# Patient Record
Sex: FEMALE | Race: WHITE | NOT HISPANIC OR LATINO | URBAN - METROPOLITAN AREA
[De-identification: names, ages, dates, MRNs, and addresses within clinical notes are randomized per-mention and may not be internally consistent; named-entity substitution may affect disease eponyms.]

---

## 2021-10-16 ENCOUNTER — EMERGENCY (EMERGENCY)
Facility: HOSPITAL | Age: 86
LOS: 1 days | Discharge: HOME | End: 2021-10-16
Attending: EMERGENCY MEDICINE | Admitting: EMERGENCY MEDICINE
Payer: MEDICARE

## 2021-10-16 VITALS
SYSTOLIC BLOOD PRESSURE: 142 MMHG | TEMPERATURE: 98 F | DIASTOLIC BLOOD PRESSURE: 72 MMHG | OXYGEN SATURATION: 99 % | RESPIRATION RATE: 18 BRPM | HEART RATE: 66 BPM

## 2021-10-16 VITALS
RESPIRATION RATE: 19 BRPM | HEART RATE: 69 BPM | TEMPERATURE: 99 F | OXYGEN SATURATION: 100 % | DIASTOLIC BLOOD PRESSURE: 72 MMHG | HEIGHT: 58 IN | WEIGHT: 89.95 LBS | SYSTOLIC BLOOD PRESSURE: 144 MMHG

## 2021-10-16 DIAGNOSIS — R22.0 LOCALIZED SWELLING, MASS AND LUMP, HEAD: ICD-10-CM

## 2021-10-16 DIAGNOSIS — L25.9 UNSPECIFIED CONTACT DERMATITIS, UNSPECIFIED CAUSE: ICD-10-CM

## 2021-10-16 LAB
ALBUMIN SERPL ELPH-MCNC: 4.1 G/DL — SIGNIFICANT CHANGE UP (ref 3.3–5)
ALP SERPL-CCNC: 69 U/L — SIGNIFICANT CHANGE UP (ref 40–120)
ALT FLD-CCNC: 12 U/L — SIGNIFICANT CHANGE UP (ref 10–45)
ANION GAP SERPL CALC-SCNC: 8 MMOL/L — SIGNIFICANT CHANGE UP (ref 5–17)
AST SERPL-CCNC: 20 U/L — SIGNIFICANT CHANGE UP (ref 10–40)
BASOPHILS # BLD AUTO: 0.03 K/UL — SIGNIFICANT CHANGE UP (ref 0–0.2)
BASOPHILS NFR BLD AUTO: 0.4 % — SIGNIFICANT CHANGE UP (ref 0–2)
BILIRUB SERPL-MCNC: 0.9 MG/DL — SIGNIFICANT CHANGE UP (ref 0.2–1.2)
BUN SERPL-MCNC: 13 MG/DL — SIGNIFICANT CHANGE UP (ref 7–23)
CALCIUM SERPL-MCNC: 9.5 MG/DL — SIGNIFICANT CHANGE UP (ref 8.4–10.5)
CHLORIDE SERPL-SCNC: 100 MMOL/L — SIGNIFICANT CHANGE UP (ref 96–108)
CO2 SERPL-SCNC: 24 MMOL/L — SIGNIFICANT CHANGE UP (ref 22–31)
CREAT SERPL-MCNC: 0.71 MG/DL — SIGNIFICANT CHANGE UP (ref 0.5–1.3)
EOSINOPHIL # BLD AUTO: 0.15 K/UL — SIGNIFICANT CHANGE UP (ref 0–0.5)
EOSINOPHIL NFR BLD AUTO: 2.2 % — SIGNIFICANT CHANGE UP (ref 0–6)
GLUCOSE SERPL-MCNC: 96 MG/DL — SIGNIFICANT CHANGE UP (ref 70–99)
HCT VFR BLD CALC: 37 % — SIGNIFICANT CHANGE UP (ref 34.5–45)
HGB BLD-MCNC: 12.5 G/DL — SIGNIFICANT CHANGE UP (ref 11.5–15.5)
IMM GRANULOCYTES NFR BLD AUTO: 0.4 % — SIGNIFICANT CHANGE UP (ref 0–1.5)
LYMPHOCYTES # BLD AUTO: 1.83 K/UL — SIGNIFICANT CHANGE UP (ref 1–3.3)
LYMPHOCYTES # BLD AUTO: 26.6 % — SIGNIFICANT CHANGE UP (ref 13–44)
MCHC RBC-ENTMCNC: 33.8 GM/DL — SIGNIFICANT CHANGE UP (ref 32–36)
MCHC RBC-ENTMCNC: 34.2 PG — HIGH (ref 27–34)
MCV RBC AUTO: 101.4 FL — HIGH (ref 80–100)
MONOCYTES # BLD AUTO: 0.53 K/UL — SIGNIFICANT CHANGE UP (ref 0–0.9)
MONOCYTES NFR BLD AUTO: 7.7 % — SIGNIFICANT CHANGE UP (ref 2–14)
NEUTROPHILS # BLD AUTO: 4.3 K/UL — SIGNIFICANT CHANGE UP (ref 1.8–7.4)
NEUTROPHILS NFR BLD AUTO: 62.7 % — SIGNIFICANT CHANGE UP (ref 43–77)
NRBC # BLD: 0 /100 WBCS — SIGNIFICANT CHANGE UP (ref 0–0)
PLATELET # BLD AUTO: 248 K/UL — SIGNIFICANT CHANGE UP (ref 150–400)
POTASSIUM SERPL-MCNC: 4.5 MMOL/L — SIGNIFICANT CHANGE UP (ref 3.5–5.3)
POTASSIUM SERPL-SCNC: 4.5 MMOL/L — SIGNIFICANT CHANGE UP (ref 3.5–5.3)
PROT SERPL-MCNC: 7.5 G/DL — SIGNIFICANT CHANGE UP (ref 6–8.3)
RBC # BLD: 3.65 M/UL — LOW (ref 3.8–5.2)
RBC # FLD: 12.7 % — SIGNIFICANT CHANGE UP (ref 10.3–14.5)
SODIUM SERPL-SCNC: 132 MMOL/L — LOW (ref 135–145)
WBC # BLD: 6.87 K/UL — SIGNIFICANT CHANGE UP (ref 3.8–10.5)
WBC # FLD AUTO: 6.87 K/UL — SIGNIFICANT CHANGE UP (ref 3.8–10.5)

## 2021-10-16 PROCEDURE — 99283 EMERGENCY DEPT VISIT LOW MDM: CPT

## 2021-10-16 PROCEDURE — 99284 EMERGENCY DEPT VISIT MOD MDM: CPT

## 2021-10-16 PROCEDURE — 36415 COLL VENOUS BLD VENIPUNCTURE: CPT

## 2021-10-16 PROCEDURE — 80053 COMPREHEN METABOLIC PANEL: CPT

## 2021-10-16 PROCEDURE — 85025 COMPLETE CBC W/AUTO DIFF WBC: CPT

## 2021-10-16 RX ORDER — CEPHALEXIN 500 MG
1 CAPSULE ORAL
Qty: 21 | Refills: 0
Start: 2021-10-16 | End: 2021-10-22

## 2021-10-16 RX ORDER — HYDROXYZINE HCL 10 MG
1 TABLET ORAL
Qty: 15 | Refills: 0
Start: 2021-10-16 | End: 2021-10-20

## 2021-10-16 NOTE — ED ADULT TRIAGE NOTE - OTHER COMPLAINTS
CC of facial swelling, extended on the pinna of the L ear x  3 days L sided, no heaviness of the face s per pt and no allergies to anything that the aide is aware of

## 2021-10-16 NOTE — ED PROVIDER NOTE - EYES, MLM
Clear bilaterally, pupils equal, round and reactive to light. + EOM b/l, no conjunctival injection b/l

## 2021-10-16 NOTE — ED ADULT NURSE NOTE - NSIMPLEMENTINTERV_GEN_ALL_ED
Implemented All Fall Risk Interventions:  [place] to call system. Call bell, personal items and telephone within reach. Instruct patient to call for assistance. Room bathroom lighting operational. Non-slip footwear when patient is off stretcher. Physically safe environment: no spills, clutter or unnecessary equipment. Stretcher in lowest position, wheels locked, appropriate side rails in place. Provide visual cue, wrist band, yellow gown, etc. Monitor gait and stability. Monitor for mental status changes and reorient to person, place, and time. Review medications for side effects contributing to fall risk. Reinforce activity limits and safety measures with patient and family.

## 2021-10-16 NOTE — ED PROVIDER NOTE - ATTENDING CONTRIBUTION TO CARE
The pt is a 89 y/o demented F, who presents to ED w/HHA, for L facial redness and L ear swelling x 3 d. According to HHA, the swelling and redness are improving, has not given pt any meds -- pt denies itching or pain. No fevers, no sore throat, no cough, no rash to body, no new foods or detergents or lotion    pt biba by hha for eval of L facial and l ear swelling x 3 d - improving but not fully resolved, no known allergens, no fevers/chills/sore throat or sob, ? contact dermatitis from face mask vs early cellulitis (less likely since symptoms improving and no warmth or tend or fevers or breaks in skin), no cervical lymphadenopathy, no stridor, labs wnl, will tx w/atarax, cover w/abx to prevent cellulitis, hha understands to ice and make certain that pt sleeps in at least 2 pillows, will f/u w/pmd for re eval in 2-3d, strict return precautions given    Agree with above note as documented by PA.  I was available as the supervising attending during patient's ER evaluation.    Pt with mild redness to left face - concern for possible cellulitis and will cover, non toxic, not septic - PA discussed with patient's PMD Dr. Escamilla who will follow up with patient within 2 days.

## 2021-10-16 NOTE — ED PROVIDER NOTE - PATIENT PORTAL LINK FT
You can access the FollowMyHealth Patient Portal offered by [facility] by registering at the following website: [email or web address]. By joining [email or web address]’s FollowMyHealth portal, you will also be able to view your health information using other applications (apps) compatible with our system.

## 2021-10-16 NOTE — ED PROVIDER NOTE - NSFOLLOWUPINSTRUCTIONS_ED_ALL_ED_FT
ice face, use new face mask, sleep on at least 2 pillows, take medication as discussed, follow up with your pmd in 2-3 days, return immediately for any worsening symptoms    Dermatitis is redness, soreness, and swelling (inflammation) of the skin. Contact dermatitis is a reaction to certain substances that touch the skin. Many substances can cause contact dermatitis including poisonous plants, chemicals, jewelry, metals, etc. Symptoms can include dryness, redness, itching, and pain.    SEEK IMMEDIATE MEDICAL CARE IF YOU HAVE ANY OF THE FOLLOWING SYMPTOMS: headache, fever, vomiting, red streaking from the affected area, or shortness of breath.

## 2021-10-16 NOTE — ED PROVIDER NOTE - ENMT, MLM
Airway patent, Nasal mucosa clear. Mouth with normal mucosa. Throat has no vesicles, no oropharyngeal exudates and uvula is midline. no stridor, no cervical lymphadenopathy b/l, + pale erythema to L upper and lower orbit, L nostril and L cheek, + swelling to L pinna -- face mask noted to be very tight on face and ears

## 2021-10-16 NOTE — ED PROVIDER NOTE - CLINICAL SUMMARY MEDICAL DECISION MAKING FREE TEXT BOX
pt biba by hha for eval of L facial and l ear swelling x 3 d - improving but not fully resolved, no known allergens, no fevers/chills/sore throat or sob, ? contact dermatitis from face mask vs early cellulitis (less likely since symptoms improving and no warmth or tend or fevers or breaks in skin), no cervical lymphadenopathy, no stridor, labs  , will tx w/atarax, cover w/abx to prevent cellulitis, hha understands to ice and make certain that pt sleeps in at least 2 pillows, will f/u w/pmd for re eval in 2-3d, strict return precautions given pt biba by hha for eval of L facial and l ear swelling x 3 d - improving but not fully resolved, no known allergens, no fevers/chills/sore throat or sob, ? contact dermatitis from face mask vs early cellulitis (less likely since symptoms improving and no warmth or tend or fevers or breaks in skin), no cervical lymphadenopathy, no stridor, labs wnl, will tx w/atarax, cover w/abx to prevent cellulitis, hha understands to ice and make certain that pt sleeps in at least 2 pillows, will f/u w/pmd for re eval in 2-3d, strict return precautions given

## 2021-10-16 NOTE — ED PROVIDER NOTE - OBJECTIVE STATEMENT
The pt is a 89 y/o demented F, who presents to ED w/HHA, for L facial redness and L ear swelling x 3 d. According to HHA, the swelling and redness are improving, has not given pt any meds -- pt denies itching or pain. No fevers, no sore throat, no cough, no rash to body, no new foods or detergents or lotion

## 2021-10-16 NOTE — ED ADULT NURSE NOTE - OBJECTIVE STATEMENT
pt from home with home attended, hx dementia poor hx, left eye and left side of face redness/swollen,  possible allergic reaction

## 2022-09-07 ENCOUNTER — TRANSCRIPTION ENCOUNTER (OUTPATIENT)
Age: 87
End: 2022-09-07

## 2022-09-07 ENCOUNTER — INPATIENT (INPATIENT)
Facility: HOSPITAL | Age: 87
LOS: 0 days | Discharge: HOME HEALTH | DRG: 556 | End: 2022-09-07
Attending: INTERNAL MEDICINE | Admitting: STUDENT IN AN ORGANIZED HEALTH CARE EDUCATION/TRAINING PROGRAM
Payer: MEDICARE

## 2022-09-07 VITALS
OXYGEN SATURATION: 95 % | TEMPERATURE: 98 F | HEIGHT: 58 IN | HEART RATE: 76 BPM | DIASTOLIC BLOOD PRESSURE: 61 MMHG | RESPIRATION RATE: 15 BRPM | WEIGHT: 130.07 LBS | SYSTOLIC BLOOD PRESSURE: 142 MMHG

## 2022-09-07 VITALS — DIASTOLIC BLOOD PRESSURE: 63 MMHG | SYSTOLIC BLOOD PRESSURE: 161 MMHG

## 2022-09-07 DIAGNOSIS — Z29.9 ENCOUNTER FOR PROPHYLACTIC MEASURES, UNSPECIFIED: ICD-10-CM

## 2022-09-07 DIAGNOSIS — F03.90 UNSPECIFIED DEMENTIA WITHOUT BEHAVIORAL DISTURBANCE: ICD-10-CM

## 2022-09-07 DIAGNOSIS — I10 ESSENTIAL (PRIMARY) HYPERTENSION: ICD-10-CM

## 2022-09-07 DIAGNOSIS — Z96.651 PRESENCE OF RIGHT ARTIFICIAL KNEE JOINT: Chronic | ICD-10-CM

## 2022-09-07 DIAGNOSIS — M25.561 PAIN IN RIGHT KNEE: ICD-10-CM

## 2022-09-07 LAB
ANION GAP SERPL CALC-SCNC: 9 MMOL/L — SIGNIFICANT CHANGE UP (ref 5–17)
BUN SERPL-MCNC: 15 MG/DL — SIGNIFICANT CHANGE UP (ref 7–23)
CALCIUM SERPL-MCNC: 9.3 MG/DL — SIGNIFICANT CHANGE UP (ref 8.4–10.5)
CHLORIDE SERPL-SCNC: 100 MMOL/L — SIGNIFICANT CHANGE UP (ref 96–108)
CO2 SERPL-SCNC: 22 MMOL/L — SIGNIFICANT CHANGE UP (ref 22–31)
CREAT SERPL-MCNC: 0.86 MG/DL — SIGNIFICANT CHANGE UP (ref 0.5–1.3)
CRP SERPL-MCNC: 21.9 MG/L — HIGH (ref 0–4)
EGFR: 62 ML/MIN/1.73M2 — SIGNIFICANT CHANGE UP
ERYTHROCYTE [SEDIMENTATION RATE] IN BLOOD: 36 MM/HR — HIGH
GLUCOSE SERPL-MCNC: 119 MG/DL — HIGH (ref 70–99)
HCT VFR BLD CALC: 38.3 % — SIGNIFICANT CHANGE UP (ref 34.5–45)
HGB BLD-MCNC: 13 G/DL — SIGNIFICANT CHANGE UP (ref 11.5–15.5)
MCHC RBC-ENTMCNC: 33.9 GM/DL — SIGNIFICANT CHANGE UP (ref 32–36)
MCHC RBC-ENTMCNC: 33.9 PG — SIGNIFICANT CHANGE UP (ref 27–34)
MCV RBC AUTO: 99.7 FL — SIGNIFICANT CHANGE UP (ref 80–100)
NRBC # BLD: 0 /100 WBCS — SIGNIFICANT CHANGE UP (ref 0–0)
PLATELET # BLD AUTO: 248 K/UL — SIGNIFICANT CHANGE UP (ref 150–400)
POTASSIUM SERPL-MCNC: 4.7 MMOL/L — SIGNIFICANT CHANGE UP (ref 3.5–5.3)
POTASSIUM SERPL-SCNC: 4.7 MMOL/L — SIGNIFICANT CHANGE UP (ref 3.5–5.3)
RBC # BLD: 3.84 M/UL — SIGNIFICANT CHANGE UP (ref 3.8–5.2)
RBC # FLD: 12.8 % — SIGNIFICANT CHANGE UP (ref 10.3–14.5)
SARS-COV-2 RNA SPEC QL NAA+PROBE: NEGATIVE — SIGNIFICANT CHANGE UP
SODIUM SERPL-SCNC: 131 MMOL/L — LOW (ref 135–145)
WBC # BLD: 9.13 K/UL — SIGNIFICANT CHANGE UP (ref 3.8–10.5)
WBC # FLD AUTO: 9.13 K/UL — SIGNIFICANT CHANGE UP (ref 3.8–10.5)

## 2022-09-07 PROCEDURE — 99285 EMERGENCY DEPT VISIT HI MDM: CPT | Mod: 25

## 2022-09-07 PROCEDURE — 73562 X-RAY EXAM OF KNEE 3: CPT | Mod: 26,RT

## 2022-09-07 PROCEDURE — 73552 X-RAY EXAM OF FEMUR 2/>: CPT | Mod: 26,RT

## 2022-09-07 PROCEDURE — 72170 X-RAY EXAM OF PELVIS: CPT | Mod: 26

## 2022-09-07 PROCEDURE — 93010 ELECTROCARDIOGRAM REPORT: CPT

## 2022-09-07 PROCEDURE — 73502 X-RAY EXAM HIP UNI 2-3 VIEWS: CPT | Mod: 26,RT

## 2022-09-07 PROCEDURE — 99233 SBSQ HOSP IP/OBS HIGH 50: CPT | Mod: GC

## 2022-09-07 RX ORDER — LANOLIN ALCOHOL/MO/W.PET/CERES
3 CREAM (GRAM) TOPICAL AT BEDTIME
Refills: 0 | Status: DISCONTINUED | OUTPATIENT
Start: 2022-09-07 | End: 2022-09-07

## 2022-09-07 RX ORDER — MIRTAZAPINE 45 MG/1
1 TABLET, ORALLY DISINTEGRATING ORAL
Qty: 0 | Refills: 0 | DISCHARGE

## 2022-09-07 RX ORDER — ONDANSETRON 8 MG/1
4 TABLET, FILM COATED ORAL EVERY 8 HOURS
Refills: 0 | Status: DISCONTINUED | OUTPATIENT
Start: 2022-09-07 | End: 2022-09-07

## 2022-09-07 RX ORDER — ATORVASTATIN CALCIUM 80 MG/1
10 TABLET, FILM COATED ORAL AT BEDTIME
Refills: 0 | Status: DISCONTINUED | OUTPATIENT
Start: 2022-09-07 | End: 2022-09-07

## 2022-09-07 RX ORDER — ATORVASTATIN CALCIUM 80 MG/1
10 TABLET, FILM COATED ORAL DAILY
Refills: 0 | Status: DISCONTINUED | OUTPATIENT
Start: 2022-09-07 | End: 2022-09-07

## 2022-09-07 RX ORDER — ACETAMINOPHEN 500 MG
1 TABLET ORAL
Qty: 21 | Refills: 0
Start: 2022-09-07 | End: 2022-09-13

## 2022-09-07 RX ORDER — ACETAMINOPHEN 500 MG
650 TABLET ORAL EVERY 6 HOURS
Refills: 0 | Status: DISCONTINUED | OUTPATIENT
Start: 2022-09-07 | End: 2022-09-07

## 2022-09-07 RX ORDER — LOSARTAN POTASSIUM 100 MG/1
100 TABLET, FILM COATED ORAL DAILY
Refills: 0 | Status: DISCONTINUED | OUTPATIENT
Start: 2022-09-07 | End: 2022-09-07

## 2022-09-07 RX ORDER — TELMISARTAN 20 MG/1
1 TABLET ORAL
Qty: 0 | Refills: 0 | DISCHARGE
Start: 2022-09-07

## 2022-09-07 RX ORDER — ATORVASTATIN CALCIUM 80 MG/1
1 TABLET, FILM COATED ORAL
Qty: 0 | Refills: 0 | DISCHARGE
Start: 2022-09-07

## 2022-09-07 RX ORDER — ACETAMINOPHEN 500 MG
650 TABLET ORAL ONCE
Refills: 0 | Status: COMPLETED | OUTPATIENT
Start: 2022-09-07 | End: 2022-09-07

## 2022-09-07 RX ORDER — MIRTAZAPINE 45 MG/1
7.5 TABLET, ORALLY DISINTEGRATING ORAL DAILY
Refills: 0 | Status: DISCONTINUED | OUTPATIENT
Start: 2022-09-07 | End: 2022-09-07

## 2022-09-07 RX ADMIN — Medication 650 MILLIGRAM(S): at 04:43

## 2022-09-07 RX ADMIN — MIRTAZAPINE 7.5 MILLIGRAM(S): 45 TABLET, ORALLY DISINTEGRATING ORAL at 12:12

## 2022-09-07 RX ADMIN — Medication 1 TABLET(S): at 12:12

## 2022-09-07 NOTE — PATIENT PROFILE ADULT - FALL HARM RISK - HARM RISK INTERVENTIONS

## 2022-09-07 NOTE — PHYSICAL THERAPY INITIAL EVALUATION ADULT - ADDITIONAL COMMENTS
Pt poor historian 2/2 confusion. Pt reports living in apartment with elevator access. Reports having rolling walker and cane, but uses cane to ambulate. Reports living with "sister". Although as per H&P pt has 24/7 HHA.

## 2022-09-07 NOTE — ED PROVIDER NOTE - PHYSICAL EXAMINATION
General: Awake, alert. No acute distress. Well developed, hydrated and nourished. Appears stated age.   Skin: Skin in warm, dry and intact without rashes or lesions. Appropriate color for ethnicity  HENMT: head normocephalic and atraumatic; bilateral external ears without swelling. no nasal discharge. moist oral mucosa. supple neck, trachea midline  EYES: Conjunctiva clear. nonicteric sclera. EOM intact, Eyelids are normal in appearance without swelling or lesions.  Cardiac: well perfused  Respiratory: breathing comfortably on room air. no audible wheezing or stridor  Abdominal: nondistended  MSK: Neck and back are without deformity, visible external skin changes, or signs of trauma. Curvature of the cervical, thoracic, and lumbar spine are within normal limits. no external signs of trauma. no apparent deficits in ROM of any extremity, unable to ambulate without signifncant pain and multi person support. ttp over anterior right knee and femur, no hip ttp, passive and active rom of hip and knee intact anf full  Neurological: The patient is awake, alert and oriented to person, place, not time, normal speech. CN 2-12 grossly intact. no apparent deficits. Memory is normal and thought process is intact. 5/5 strength in all 4 extremities  Psychiatric: Appropriate mood and affect. Good judgement and insight. No visual or auditory hallucinations.

## 2022-09-07 NOTE — H&P ADULT - PROBLEM SELECTOR PLAN 3
Patient with pmhx of hypertension on telmisartan 80mg qdaily at home.   - started patient on TI losartan 100mg 1daily Patient with pmhx of hypertension on telmisartan 80mg qdaily at home.   - started patient on TI losartan 100mg qdaily Patient with pmhx of hypertension on telmisartan 80mg qdaily at home.   - started patient on therapeutic interchange losartan 100mg qdaily

## 2022-09-07 NOTE — ED ADULT NURSE REASSESSMENT NOTE - NS ED NURSE REASSESS COMMENT FT1
received pt from RN. pt resting comfortably on stretcher with tech at bedside. respirations equal and unlabored. pending sign out from MD.

## 2022-09-07 NOTE — DISCHARGE NOTE PROVIDER - HOSPITAL COURSE
#Discharge:     89y/o Female with a pmhx of dementia and HTN, s/p right knee replacement, presenting with an inability to walk. As per [facility], patient lives alone with 24hr HHA. HHA reports that patient normally ambulates on a cane, but recently started complaining of right knee pain that has progressed and prevented her from getting up and walking. Patient was admitted for R knee pain.     Hospital course:    #Right knee pain  Patient with pmhx of right knee replacement unclear date of surgery, presenting with recent complaints of pain in her right knee as per HHA. HHA denies any trauma, fall, recent injury. Walks with cane at home at baseline.   Right knee xray WNL no fracture or effusion. Osteopenia. No fevers, chills, or leukocytosis, On exam no erythema, non-tender, no effusion, normal ROM, no warmth. Less concern for cellulitis or septic arthritis. More likely related OA and old knee replacement. Patient received pain control with acetaminophen 650mg q6hr PRN for mild pain. PT was consulted and recommended home PT.    #Dementia  Patient presented with A, as per HHA patient has dementia. On no dementia meds. Legal  makes decisions for patient. Baseline AAOx1     #Hypertension  Patient with pmhx of hypertension on telmisartan 80mg qdaily at home. Patient was started on therapeutic interchange losartan 100mg qdaily.    Patient was discharged to home with home PT.     New medications: acetaminophen 650mg every 8 hours as needed for pain.   Changes to old medications: no changes to old medications.   Medications that were stopped: none.     Items to follow up as outpatient: Please follow up with Dr. Trip Escamilla PCP once he returns from vacation.     Physical exam at the time of discharge:  General: NAD, well developed, appears stated age, pleasant  HEENT: NC/AT; EOMI, anisocoria   Neck: supple, trachea midline, no LAD, no JVD  Cardiovascular: RRR, +S1/S2  Respiratory: CTAB, no increased work of breathing, no accessory muscle use  Gastrointestinal: soft, NT/ND, normal bowel sounds, no guarding  Extremities: WWP; no edema or cyanosis , Right knee full ROM nontender, no wound or abrasion, no erythema, no warm, no effusion, Mild crepitus in exam,   Vascular: 2+ radial, DP pulses b/l  Neurological: AAOx1, no focal deficits  Dermatological: no rashes noted, scar over right knee consistent with history of R knee replacement  Gait: Ambulating with ED walker, able to take small steps

## 2022-09-07 NOTE — H&P ADULT - ATTENDING COMMENTS
Patient does not have any fever , no recent trauma , no joint swelling , no joint discoloration , no leg swelling , no leg ulcer,  no hip swelling , no leg discoloration    Has good peripheral pulses   No calf, quads, hamstring  tenderness   ROM of left ankle , knee and hip wnl and not restricted by pain     PT eval

## 2022-09-07 NOTE — DISCHARGE NOTE PROVIDER - NSDCMRMEDTOKEN_GEN_ALL_CORE_FT
atorvastatin 10 mg oral tablet: 1 tab(s) orally once a day  mirtazapine 7.5 mg oral tablet: 1 tab(s) orally once a day  Multiple Vitamins oral tablet: 1 tab(s) orally once a day  telmisartan 80 mg oral tablet: 1 tab(s) orally once a day  Tylenol 8 Hour 650 mg oral tablet, extended release: 1 tab(s) orally every 8 hours PRN pain

## 2022-09-07 NOTE — DISCHARGE NOTE PROVIDER - NSDCCPCAREPLAN_GEN_ALL_CORE_FT
PRINCIPAL DISCHARGE DIAGNOSIS  Diagnosis: Right knee pain  Assessment and Plan of Treatment: You presented with right knee pain and an inability to walk. Your vitals were stable, there were no abormalities in your labs. X-ray demonstrated no evidence of fractures or effusions. You received acetaminophen 650mg once in the ED. On physical exam, you had full range of motion in your right knee. There was no evidence of warmth, tenderness to palpation, swelling, or erythema.   You were evaluated by physical therapy and they recommended home physical therapy. Please follow up with physical therapy. You are also being discharged with tylenol 650mg every 8 hours as needed for pain. Please take this medication whenever you feel pain. Please also follow up with Dr. Trip Escamilla when he returns to his office.

## 2022-09-07 NOTE — DISCHARGE NOTE NURSING/CASE MANAGEMENT/SOCIAL WORK - PATIENT PORTAL LINK FT
You can access the FollowMyHealth Patient Portal offered by [facility] by registering at the following website: [email or web address]. By joining [facility]’s FollowMyHealth portal, you will also be able to view your health information using other applications (apps) compatible with our system.

## 2022-09-07 NOTE — DISCHARGE NOTE PROVIDER - CARE PROVIDER_API CALL
Trip Escamilla  INTERNAL MEDICINE  [address] [place] [id number]  Phone: [phone]  Fax: [phone]  Established Patient  Follow Up Time: 2 weeks   Trip Escamilla  INTERNAL MEDICINE  [address] [place] [id number]  Phone: [phone]  Fax: [phone]  Established Patient  Scheduled Appointment: 10/03/2022 11:00 AM

## 2022-09-07 NOTE — H&P ADULT - NSHPPHYSICALEXAM_GEN_ALL_CORE
PHYSICAL EXAM:    Vital Signs Last 24 Hrs  T(C): 36.6 (07 Sep 2022 08:36), Max: 36.8 (07 Sep 2022 02:11)  T(F): 97.9 (07 Sep 2022 08:36), Max: 98.3 (07 Sep 2022 02:11)  HR: 63 (07 Sep 2022 08:36) (63 - 76)  BP: 127/82 (07 Sep 2022 08:36) (127/82 - 145/74)  BP(mean): --  RR: 18 (07 Sep 2022 08:36) (15 - 18)  SpO2: 98% (07 Sep 2022 08:36) (95% - 98%)    Parameters below as of 07 Sep 2022 08:36  Patient On (Oxygen Delivery Method): room air      I&O's Summary      General: NAD, well developed, appears stated age, pleasant  HEENT: NC/AT; EOMI  Neck: supple, trachea midline, no LAD, no JVD  Cardiovascular: RRR, +S1/S2  Respiratory: CTAB, no increased work of breathing, no accessory muscle use  Gastrointestinal: soft, NT/ND, normal bowel sounds, no gaurding  Extremities: WWP; no edema or cyanosis   Vascular: 2+ radial, DP pulses b/l  Neurological: AAOx2, no focal deficits  Dermatological: no rashes noted, scar over right knee consistent with history of R knee replacement  Gait: Ambulating with ED walker, able to take small steps PHYSICAL EXAM:    Vital Signs Last 24 Hrs  T(C): 36.6 (07 Sep 2022 08:36), Max: 36.8 (07 Sep 2022 02:11)  T(F): 97.9 (07 Sep 2022 08:36), Max: 98.3 (07 Sep 2022 02:11)  HR: 63 (07 Sep 2022 08:36) (63 - 76)  BP: 127/82 (07 Sep 2022 08:36) (127/82 - 145/74)  BP(mean): --  RR: 18 (07 Sep 2022 08:36) (15 - 18)  SpO2: 98% (07 Sep 2022 08:36) (95% - 98%)    Parameters below as of 07 Sep 2022 08:36  Patient On (Oxygen Delivery Method): room air      I&O's Summary      General: NAD, well developed, appears stated age, pleasant  HEENT: NC/AT; EOMI  Neck: supple, trachea midline, no LAD, no JVD  Cardiovascular: RRR, +S1/S2  Respiratory: CTAB, no increased work of breathing, no accessory muscle use  Gastrointestinal: soft, NT/ND, normal bowel sounds, no guarding  Extremities: WWP; no edema or cyanosis   Vascular: 2+ radial, DP pulses b/l  Neurological: AAOx1, no focal deficits  Dermatological: no rashes noted, scar over right knee consistent with history of R knee replacement  Gait: Ambulating with ED walker, able to take small steps PHYSICAL EXAM:    Vital Signs Last 24 Hrs  T(C): 36.6 (07 Sep 2022 08:36), Max: 36.8 (07 Sep 2022 02:11)  T(F): 97.9 (07 Sep 2022 08:36), Max: 98.3 (07 Sep 2022 02:11)  HR: 63 (07 Sep 2022 08:36) (63 - 76)  BP: 127/82 (07 Sep 2022 08:36) (127/82 - 145/74)  BP(mean): --  RR: 18 (07 Sep 2022 08:36) (15 - 18)  SpO2: 98% (07 Sep 2022 08:36) (95% - 98%)    Parameters below as of 07 Sep 2022 08:36  Patient On (Oxygen Delivery Method): room air      I&O's Summary      General: NAD, well developed, appears stated age, pleasant  HEENT: NC/AT; EOMI  Neck: supple, trachea midline, no LAD, no JVD  Cardiovascular: RRR, +S1/S2  Respiratory: CTAB, no increased work of breathing, no accessory muscle use  Gastrointestinal: soft, NT/ND, normal bowel sounds, no guarding  Extremities: WWP; no edema or cyanosis , Right knee full ROM nontender, no wound or abrasion, no erythema, no warm, no effusion, Mild crepitus in exam,   Vascular: 2+ radial, DP pulses b/l  Neurological: AAOx1, no focal deficits  Dermatological: no rashes noted, scar over right knee consistent with history of R knee replacement  Gait: Ambulating with ED walker, able to take small steps PHYSICAL EXAM:    Vital Signs Last 24 Hrs  T(C): 36.6 (07 Sep 2022 08:36), Max: 36.8 (07 Sep 2022 02:11)  T(F): 97.9 (07 Sep 2022 08:36), Max: 98.3 (07 Sep 2022 02:11)  HR: 63 (07 Sep 2022 08:36) (63 - 76)  BP: 127/82 (07 Sep 2022 08:36) (127/82 - 145/74)  BP(mean): --  RR: 18 (07 Sep 2022 08:36) (15 - 18)  SpO2: 98% (07 Sep 2022 08:36) (95% - 98%)    Parameters below as of 07 Sep 2022 08:36  Patient On (Oxygen Delivery Method): room air      I&O's Summary      General: NAD, well developed, appears stated age, pleasant  HEENT: NC/AT; EOMI, anisocoria   Neck: supple, trachea midline, no LAD, no JVD  Cardiovascular: RRR, +S1/S2  Respiratory: CTAB, no increased work of breathing, no accessory muscle use  Gastrointestinal: soft, NT/ND, normal bowel sounds, no guarding  Extremities: WWP; no edema or cyanosis , Right knee full ROM nontender, no wound or abrasion, no erythema, no warm, no effusion, Mild crepitus in exam,   Vascular: 2+ radial, DP pulses b/l  Neurological: AAOx1, no focal deficits  Dermatological: no rashes noted, scar over right knee consistent with history of R knee replacement  Gait: Ambulating with ED walker, able to take small steps

## 2022-09-07 NOTE — H&P ADULT - PROBLEM SELECTOR PLAN 1
Patient with pmhx of right knee replacement, presenting with recent complaints of pain in her right knee as per HHA.   - Pain control Patient with pmhx of right knee replacement, presenting with recent complaints of pain in her right knee as per HHA.   - Pain control with acetaminophen 650mg q6hr PRN for mild pain   - f/u PT consult Patient with pmhx of right knee replacement unclear date of surgery, presenting with recent complaints of pain in her right knee as per HHA. HHA denies any trauma, fall, recent injury. Walks with cane at home at baseline.   - Right knee preliminary xray WNL no Fx or effusion. Pending official read. No fever, no chill, no leukocytosis, On exam no erythema, non tender, no effusion, normal ROM, No warm. Less concern for cellulitis or septic arthritis.   -f/u ESR and CRP   - Pain control with acetaminophen 650mg q6hr PRN for mild pain   - f/u PT consult  -Fall percussion Patient with pmhx of right knee replacement unclear date of surgery, presenting with recent complaints of pain in her right knee as per HHA. HHA denies any trauma, fall, recent injury. Walks with cane at home at baseline.   - Right knee preliminary xray WNL no Fx or effusion. Pending official read. No fever, no chill, no leukocytosis, On exam no erythema, non tender, no effusion, normal ROM, No warm. Less concern for cellulitis or septic arthritis. More likely related OA and old knee replacement   -f/u ESR and CRP   - Pain control with acetaminophen 650mg q6hr PRN for mild pain   - f/u PT consult  -Fall percussion Patient with pmhx of right knee replacement unclear date of surgery, presenting with recent complaints of pain in her right knee as per HHA. HHA denies any trauma, fall, recent injury. Walks with cane at home at baseline.   - Right knee preliminary xray WNL no Fx or effusion. Pending official read. No fever, no chill, no leukocytosis, On exam no erythema, non tender, no effusion, normal ROM, No warm. Less concern for cellulitis or septic arthritis. More likely related OA and old knee replacement   - f/u ESR and CRP   - Pain control with acetaminophen 650mg q6hr PRN for mild pain   - f/u PT consult  - Fall precaution Patient with pmhx of right knee replacement unclear date of surgery, presenting with recent complaints of pain in her right knee as per HHA. HHA denies any trauma, fall, recent injury. Walks with cane at home at baseline.   - Right knee xray WNL no fracture or effusion. Osteopenia. No fevers, chills, or leukocytosis, On exam no erythema, non-tender, no effusion, normal ROM, no warmth. Less concern for cellulitis or septic arthritis. More likely related OA and old knee replacement   - f/u ESR and CRP   - Pain control with acetaminophen 650mg q6hr PRN for mild pain   - f/u PT consult  - Fall precaution

## 2022-09-07 NOTE — PATIENT PROFILE ADULT - FUNCTIONAL ASSESSMENT - BASIC MOBILITY 6.
3-calculated by average/Not able to assess (calculate score using [facility] averaging method)

## 2022-09-07 NOTE — PHYSICAL THERAPY INITIAL EVALUATION ADULT - GAIT DEVIATIONS NOTED, PT EVAL
RLE externally rotate, decreased step length RLE, impaired tolerance for single leg stance on RLE/decreased shaina/decreased step length/decreased weight-shifting ability

## 2022-09-07 NOTE — H&P ADULT - HISTORY OF PRESENT ILLNESS
Patient ONEIDA RANDHAWA is a 89y Female with a past medical history of dementia, s/p right knee replacement, presenting with an inability to walk. As per HHA, patient lives alone with 24hr HHA. HHA reports that patient normally ambulates on a cane, but recently started complaining of right knee pain that has progressed and prevented her from getting up and walking.   ROS: HHA denies any recent fever, chills, vomiting, or extremity swelling.    PCP: Dr. Trip Escamilla  Pharmacy: [place] [address]    ED Course  Vitals: T 98.3 /61  HR 76 RR 15 SaO2 95% on room air   Labs: Na 131  Imaging: x-ray  Medication: atorvastatin 10 mg qdaily, mirtazapine 7.5mg qdaily, telmisartan 80mg qdaily        Patient ONEIDA RANDHAWA is a 89y Female with a past medical history of dementia, s/p right knee replacement, presenting with an inability to walk. As per HHA, patient lives alone with 24hr HHA. HHA reports that patient normally ambulates on a cane, but recently started complaining of right knee pain that has progressed and prevented her from getting up and walking.   ROS: HHA denies any recent fever, chills, vomiting, or extremity swelling.    PCP: Dr. Trip Escamilla  Patient's : Enmanuel [phone]  Pharmacy: [place] [address]    ED Course  Vitals: T 98.3 /61  HR 76 RR 15 SaO2 95% on room air   Labs: Na 131  Imaging: x-ray  Medication: atorvastatin 10 mg qdaily, mirtazapine 7.5mg qdaily, telmisartan 80mg qdaily        Patient ONEIDA RANDHAWA is a 89y Female with a past medical history of dementia, s/p right knee replacement, presenting with an inability to walk. As per HHA, patient lives alone with 24hr HHA. HHA reports that patient normally ambulates on a cane, but recently started complaining of right knee pain that has progressed and prevented her from getting up and walking.   ROS: HHA denies any recent fever, chills, vomiting, or extremity swelling.    PCP: Dr. Trip Escamilla  Patient's : Enmanuel [phone]  [facility] [address] [phone]    ED Course  Vitals: T 98.3 /61  HR 76 RR 15 SaO2 95% on room air   Labs: Na 131  Imaging: x-ray  Medication: acetaminophen 650mg x1       Patient ONEIDA RANDHAWA is a 89y Female with a past medical history of dementia, s/p right knee replacement, presenting with an inability to walk. As per A, patient lives alone with 24hr HHA. HHA reports that patient normally ambulates on a cane, but recently started complaining of right knee pain that has progressed and prevented her from getting up and walking.   ROS: HHA denies any recent fever, chills, vomiting, or extremity swelling.  Patient denies HA, dizziness N/V, SOB, CP, abdominal pain, dysuria.     PCP: Dr. Trip Escamilla  Patient's : Enmanuel [phone]  Pharmacy: [place] [address] [phone]    ED Course  Vitals: T 98.3 /61  HR 76 RR 15 SaO2 95% on room air   Labs: CBC grossly nl, CMP: normal except Na 131  Imaging: x-ray  Medication: acetaminophen 650mg x1       Patient ONEIDA RANDHAWA is a 89y Female with a past medical history of dementia, s/p right knee replacement, presenting with an inability to walk. As per A, patient lives alone with 24hr HHA. HHA reports that patient normally ambulates on a cane, but recently started complaining of right knee pain that has progressed and prevented her from getting up and walking.   ROS: HHA denies any recent fever, chills, vomiting, or extremity swelling.  Patient denies HA, dizziness N/V, SOB, CP, abdominal pain, dysuria.     PCP: Dr. Trip Escamilla  Patient's : Enmanuel [phone]  Pharmacy: [place] [address] [phone]    ED Course  Vitals: T 98.3 /61  HR 76 RR 15 SaO2 95% on room air   Labs: CBC grossly nl, CMP: normal except Na 131  Imaging: x-ray  Medication: acetaminophen 650mg x1 in the ED       Patient ONEIDA RANDHAWA is a 89y Female with a past medical history of dementia, s/p right knee replacement, presenting with an inability to walk. As per A, patient lives alone with 24hr HHA. HHA reports that patient normally ambulates on a cane, but recently started complaining of right knee pain that has progressed and prevented her from getting up and walking.   ROS: HHA denies any recent fever, chills, vomiting, or extremity swelling.  Patient denies HA, dizziness N/V, SOB, CP, abdominal pain, dysuria.     PCP: Dr. Trip Escamilla  Patient's : Enmanuel [phone]  Pharmacy: [place] [address] [phone]    ED Course  Vitals: T 98.3 /61  HR 76 RR 15 SaO2 95% on room air   EKG: NSR  Labs: CBC grossly nl, CMP: normal except Na 131  Imaging: x-ray  Medication: acetaminophen 650mg x1 in the ED       Patient ONEIDA RANDHAWA is a 89y Female with a past medical history of dementia, HTN, s/p right knee replacement, presenting with an inability to walk. As per A, patient lives alone with 24hr HHA. HHA reports that patient normally ambulates on a cane, but recently started complaining of right knee pain that has progressed and prevented her from getting up and walking.   ROS: HHA denies any recent fever, chills, vomiting, or extremity swelling.  Patient denies HA, dizziness N/V, SOB, CP, abdominal pain, dysuria.     PCP: Dr. Trip Escamilla  Patient's : Enmanuel [phone]  Pharmacy: [place] [address] [phone]    ED Course  Vitals: T 98.3 /61  HR 76 RR 15 SaO2 95% on room air   EKG: NSR  Labs: CBC grossly nl, CMP: normal except Na 131  Imaging: x-ray  Medication: acetaminophen 650mg x1 in the ED       Patient ONEIDA RANDHAWA is a 89y Female with a past medical history of dementia, HTN, s/p right knee replacement, presenting with an inability to walk. As per A, patient lives alone with 24hr HHA. HHA reports that patient normally ambulates on a cane, but recently started complaining of right knee pain that has progressed and prevented her from getting up and walking.   ROS: HHA denies any recent fever, chills, vomiting, or extremity swelling.  Patient denies HA, dizziness N/V, SOB, CP, abdominal pain, dysuria.     PCP: Dr. Trip Escamilla  Patient's : Enmanuel [phone]  Pharmacy: [place] [address] [phone]    ED Course  Vitals: T 98.3 /61  HR 76 RR 15 SaO2 95% on room air   EKG: NSR  Labs: CBC grossly nl, CMP: normal except Na 131  Imaging: x-ray of right knee shows no evidence of fracture or effusion. Osteopenia.   Medication: acetaminophen 650mg x1 in the ED

## 2022-09-07 NOTE — ED ADULT TRIAGE NOTE - CHIEF COMPLAINT QUOTE
R knee pain since today. denies falling. was sitting on toilet, couldn't get up. hx of hld, depression and dementia. has 24/7 HHA.

## 2022-09-07 NOTE — DISCHARGE NOTE PROVIDER - PROVIDER TOKENS
PROVIDER:[TOKEN:[[id number]],FOLLOWUP:[2 weeks],ESTABLISHEDPATIENT:[T]] PROVIDER:[TOKEN:[[id number]],SCHEDULEDAPPT:[10/03/2022],SCHEDULEDAPPTTIME:[11:00 AM],ESTABLISHEDPATIENT:[T]]

## 2022-09-07 NOTE — PHYSICAL THERAPY INITIAL EVALUATION ADULT - NSPTDISCHREC_GEN_A_CORE
Home with Home PT with continued 24/7 HHA // If pt does not have 24/7 HHA assist, pt to required Subacute Rehab

## 2022-09-07 NOTE — ED PROVIDER NOTE - OBJECTIVE STATEMENT
89f hx dementia presenting for right leg pain. patient was in her usual state of health today. went to the bathroom prior to ED arrival, was unable to stand due to right knee/thigh pain. at abseline ambulates with a cane, patinet demented and moderately confused (at her baseline mental state), history provided by HHA at bedside Stable

## 2022-09-07 NOTE — ED ADULT NURSE NOTE - NSIMPLEMENTINTERV_GEN_ALL_ED
Implemented All Fall with Harm Risk Interventions:  [place] to call system. Call bell, personal items and telephone within reach. Instruct patient to call for assistance. Room bathroom lighting operational. Non-slip footwear when patient is off stretcher. Physically safe environment: no spills, clutter or unnecessary equipment. Stretcher in lowest position, wheels locked, appropriate side rails in place. Provide visual cue, wrist band, yellow gown, etc. Monitor gait and stability. Monitor for mental status changes and reorient to person, place, and time. Review medications for side effects contributing to fall risk. Reinforce activity limits and safety measures with patient and family. Provide visual clues: red socks.

## 2022-09-07 NOTE — DISCHARGE NOTE NURSING/CASE MANAGEMENT/SOCIAL WORK - NSDCPEFALRISK_GEN_ALL_CORE
For information on Fall & Injury Prevention, visit: [email or web address] OR  [email or web address] OR  https://www.cdc.gov/steadi/patient.html

## 2022-09-07 NOTE — PHYSICAL THERAPY INITIAL EVALUATION ADULT - THERAPY FREQUENCY, PT EVAL
Patient educated on frequency of inpatient physical therapy at [facility], patient verbalized understanding./2-3x/week

## 2022-09-07 NOTE — H&P ADULT - ASSESSMENT
89y/o Female with a past medical history of dementia, s/p right knee replacement, presenting with an inability to walk. As per HHA, patient lives alone with 24hr HHA. HHA reports that patient normally ambulates on a cane, but recently started complaining of right knee pain that has progressed and prevented her from getting up and walking. Patient was admitted for R knee pain.  89y/o Female with a past medical history of dementia, HTN, s/p right knee replacement, presenting with an inability to walk. As per HHA, patient lives alone with 24hr HHA. HHA reports that patient normally ambulates on a cane, but recently started complaining of right knee pain that has progressed and prevented her from getting up and walking. Patient was admitted for R knee pain.

## 2022-09-07 NOTE — DISCHARGE NOTE PROVIDER - NSDCFUADDAPPT_GEN_ALL_CORE_FT
Please bring your Insurance card, Photo ID and Discharge paperwork to the following appointment:    (1) Please follow up with your Primary Care Provider, Dr. Trip Escamilla at [address] [place] [address] on 10/03/2022 at 11:00am.    Appointment was scheduled by Ms. WILDER Nova, Referral Coordinator.

## 2022-09-07 NOTE — ED PROVIDER NOTE - CLINICAL SUMMARY MEDICAL DECISION MAKING FREE TEXT BOX
imaging negative for trauma, no neurologic deficits, ambulation attempted in ED with walker and 2 person assistance, patient unable to ambulate, will require admission for PT, possible orthopedic evlauation and possible rehab

## 2022-09-07 NOTE — PHYSICAL THERAPY INITIAL EVALUATION ADULT - PERTINENT HX OF CURRENT PROBLEM, REHAB EVAL
89y/o Female with a past medical history of dementia, HTN, s/p right knee replacement, presenting with an inability to walk. As per HHA, patient lives alone with 24hr HHA. HHA reports that patient normally ambulates on a cane, but recently started complaining of right knee pain that has progressed and prevented her from getting up and walking. Patient was admitted for R knee pain.

## 2022-09-07 NOTE — PHYSICAL THERAPY INITIAL EVALUATION ADULT - GENERAL OBSERVATIONS, REHAB EVAL
PT IE completed. Chart reviewed. Pt received semi-supine, NAD, +IV heplock, +bed alarm. HALEY Ross cleared pt for PT.

## 2022-09-07 NOTE — H&P ADULT - PROBLEM SELECTOR PLAN 2
Patient presents with HHA, as per HHA patient has dementia. Patient presents with HHA, as per HHA patient has dementia. On no dementia meds. Legal  makes a decision for her. Baseline AAOx1     -Pending receive the documents related to HCP and meds from  by email.  Enmanuel: [phone].  -C/w home med mirtazapine 7.5mg daily Patient presents with HHA, as per HHA patient has dementia. On no dementia meds. Legal  makes a decision for her. Baseline AAOx1     - Pending receipt of documents regarding HCP and meds from  by email.  Enmanuel Shine: [phone].  - c/w home med mirtazapine 7.5mg daily Patient presents with HHA, as per HHA patient has dementia. On no dementia meds. Legal  makes decisions for patient. Baseline AAOx1     - Pending receipt of documents regarding HCP and meds from  by email.  Enmanuel Shine: [phone].  - c/w home med mirtazapine 7.5mg daily

## 2022-09-07 NOTE — H&P ADULT - NSHPLABSRESULTS_GEN_ALL_CORE
LABS:                         13.0   9.13  )-----------( 248      ( 07 Sep 2022 04:41 )             38.3     09-07    131<L>  |  100  |  15  ----------------------------<  119<H>  4.7   |  22  |  0.86    Ca    9.3      07 Sep 2022 04:41          CAPILLARY BLOOD GLUCOSE        RADIOLOGY, EKG & ADDITIONAL TESTS:

## 2022-09-07 NOTE — H&P ADULT - PROBLEM SELECTOR PLAN 4
F: none  E: replete as needed  N: regular diet  DVT:   GI ppx: F: none  E: replete as needed  N: regular diet  DVT: SCD and Lovenox   GI ppx: None   CODE: FULL CODE approved by Enmanuel guzman   Dispo: [facility] F: none  E: replete as needed  N: regular diet  DVT: SCD and Lovenox   GI ppx: None   CODE: FULL CODE approved by Enmanuel Shine    Dispo: CLARA

## 2022-09-10 DIAGNOSIS — Z96.651 PRESENCE OF RIGHT ARTIFICIAL KNEE JOINT: ICD-10-CM

## 2022-09-10 DIAGNOSIS — M25.561 PAIN IN RIGHT KNEE: ICD-10-CM

## 2022-09-10 DIAGNOSIS — I10 ESSENTIAL (PRIMARY) HYPERTENSION: ICD-10-CM

## 2022-09-10 DIAGNOSIS — F03.90 UNSPECIFIED DEMENTIA, UNSPECIFIED SEVERITY, WITHOUT BEHAVIORAL DISTURBANCE, PSYCHOTIC DISTURBANCE, MOOD DISTURBANCE, AND ANXIETY: ICD-10-CM

## 2022-09-10 DIAGNOSIS — M85.861 OTHER SPECIFIED DISORDERS OF BONE DENSITY AND STRUCTURE, RIGHT LOWER LEG: ICD-10-CM

## 2022-09-10 DIAGNOSIS — M17.11 UNILATERAL PRIMARY OSTEOARTHRITIS, RIGHT KNEE: ICD-10-CM

## 2022-09-15 PROCEDURE — 86140 C-REACTIVE PROTEIN: CPT

## 2022-09-15 PROCEDURE — 97161 PT EVAL LOW COMPLEX 20 MIN: CPT

## 2022-09-15 PROCEDURE — 87635 SARS-COV-2 COVID-19 AMP PRB: CPT

## 2022-09-15 PROCEDURE — 80048 BASIC METABOLIC PNL TOTAL CA: CPT

## 2022-09-15 PROCEDURE — 93005 ELECTROCARDIOGRAM TRACING: CPT

## 2022-09-15 PROCEDURE — 85652 RBC SED RATE AUTOMATED: CPT

## 2022-09-15 PROCEDURE — 73562 X-RAY EXAM OF KNEE 3: CPT

## 2022-09-15 PROCEDURE — 73552 X-RAY EXAM OF FEMUR 2/>: CPT

## 2022-09-15 PROCEDURE — 99285 EMERGENCY DEPT VISIT HI MDM: CPT | Mod: 25

## 2022-09-15 PROCEDURE — 73502 X-RAY EXAM HIP UNI 2-3 VIEWS: CPT

## 2022-09-15 PROCEDURE — 36415 COLL VENOUS BLD VENIPUNCTURE: CPT

## 2022-09-15 PROCEDURE — 85027 COMPLETE CBC AUTOMATED: CPT

## 2023-03-10 ENCOUNTER — INPATIENT (INPATIENT)
Facility: HOSPITAL | Age: 88
LOS: 6 days | Discharge: NURSING, REHAB OR LONG-TERM CARE | DRG: 536 | End: 2023-03-17
Attending: INTERNAL MEDICINE | Admitting: INTERNAL MEDICINE
Payer: MEDICARE

## 2023-03-10 VITALS
SYSTOLIC BLOOD PRESSURE: 128 MMHG | TEMPERATURE: 97 F | DIASTOLIC BLOOD PRESSURE: 77 MMHG | WEIGHT: 102.07 LBS | OXYGEN SATURATION: 97 % | HEIGHT: 64 IN | HEART RATE: 75 BPM | RESPIRATION RATE: 18 BRPM

## 2023-03-10 DIAGNOSIS — D72.829 ELEVATED WHITE BLOOD CELL COUNT, UNSPECIFIED: ICD-10-CM

## 2023-03-10 DIAGNOSIS — F03.90 UNSPECIFIED DEMENTIA, UNSPECIFIED SEVERITY, WITHOUT BEHAVIORAL DISTURBANCE, PSYCHOTIC DISTURBANCE, MOOD DISTURBANCE, AND ANXIETY: ICD-10-CM

## 2023-03-10 DIAGNOSIS — Z29.9 ENCOUNTER FOR PROPHYLACTIC MEASURES, UNSPECIFIED: ICD-10-CM
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
## 2023-03-10 DIAGNOSIS — I10 ESSENTIAL (PRIMARY) HYPERTENSION: ICD-10-CM

## 2023-03-10 DIAGNOSIS — Z96.651 PRESENCE OF RIGHT ARTIFICIAL KNEE JOINT: Chronic | ICD-10-CM

## 2023-03-10 LAB
ALBUMIN SERPL ELPH-MCNC: 3.8 G/DL — SIGNIFICANT CHANGE UP (ref 3.3–5)
ALP SERPL-CCNC: 71 U/L — SIGNIFICANT CHANGE UP (ref 40–120)
ALT FLD-CCNC: 12 U/L — SIGNIFICANT CHANGE UP (ref 10–45)
ANION GAP SERPL CALC-SCNC: 10 MMOL/L — SIGNIFICANT CHANGE UP (ref 5–17)
AST SERPL-CCNC: 20 U/L — SIGNIFICANT CHANGE UP (ref 10–40)
BASOPHILS # BLD AUTO: 0.04 K/UL — SIGNIFICANT CHANGE UP (ref 0–0.2)
BASOPHILS NFR BLD AUTO: 0.3 % — SIGNIFICANT CHANGE UP (ref 0–2)
BILIRUB SERPL-MCNC: 0.5 MG/DL — SIGNIFICANT CHANGE UP (ref 0.2–1.2)
BUN SERPL-MCNC: 16 MG/DL — SIGNIFICANT CHANGE UP (ref 7–23)
CALCIUM SERPL-MCNC: 10 MG/DL — SIGNIFICANT CHANGE UP (ref 8.4–10.5)
CHLORIDE SERPL-SCNC: 103 MMOL/L — SIGNIFICANT CHANGE UP (ref 96–108)
CK SERPL-CCNC: 68 U/L — SIGNIFICANT CHANGE UP (ref 25–170)
CO2 SERPL-SCNC: 25 MMOL/L — SIGNIFICANT CHANGE UP (ref 22–31)
CREAT SERPL-MCNC: 0.84 MG/DL — SIGNIFICANT CHANGE UP (ref 0.5–1.3)
EGFR: 63 ML/MIN/1.73M2 — SIGNIFICANT CHANGE UP
EOSINOPHIL # BLD AUTO: 0.1 K/UL — SIGNIFICANT CHANGE UP (ref 0–0.5)
EOSINOPHIL NFR BLD AUTO: 0.8 % — SIGNIFICANT CHANGE UP (ref 0–6)
GLUCOSE SERPL-MCNC: 110 MG/DL — HIGH (ref 70–99)
HCT VFR BLD CALC: 37.6 % — SIGNIFICANT CHANGE UP (ref 34.5–45)
HGB BLD-MCNC: 12.6 G/DL — SIGNIFICANT CHANGE UP (ref 11.5–15.5)
IMM GRANULOCYTES NFR BLD AUTO: 0.6 % — SIGNIFICANT CHANGE UP (ref 0–0.9)
LACTATE SERPL-SCNC: 0.9 MMOL/L — SIGNIFICANT CHANGE UP (ref 0.5–2)
LYMPHOCYTES # BLD AUTO: 1.49 K/UL — SIGNIFICANT CHANGE UP (ref 1–3.3)
LYMPHOCYTES # BLD AUTO: 12.1 % — LOW (ref 13–44)
MCHC RBC-ENTMCNC: 33.5 GM/DL — SIGNIFICANT CHANGE UP (ref 32–36)
MCHC RBC-ENTMCNC: 34.2 PG — HIGH (ref 27–34)
MCV RBC AUTO: 102.2 FL — HIGH (ref 80–100)
MONOCYTES # BLD AUTO: 0.45 K/UL — SIGNIFICANT CHANGE UP (ref 0–0.9)
MONOCYTES NFR BLD AUTO: 3.6 % — SIGNIFICANT CHANGE UP (ref 2–14)
NEUTROPHILS # BLD AUTO: 10.19 K/UL — HIGH (ref 1.8–7.4)
NEUTROPHILS NFR BLD AUTO: 82.6 % — HIGH (ref 43–77)
NRBC # BLD: 0 /100 WBCS — SIGNIFICANT CHANGE UP (ref 0–0)
PLATELET # BLD AUTO: 238 K/UL — SIGNIFICANT CHANGE UP (ref 150–400)
POTASSIUM SERPL-MCNC: 4.6 MMOL/L — SIGNIFICANT CHANGE UP (ref 3.5–5.3)
POTASSIUM SERPL-SCNC: 4.6 MMOL/L — SIGNIFICANT CHANGE UP (ref 3.5–5.3)
PROT SERPL-MCNC: 6.6 G/DL — SIGNIFICANT CHANGE UP (ref 6–8.3)
RBC # BLD: 3.68 M/UL — LOW (ref 3.8–5.2)
RBC # FLD: 13.1 % — SIGNIFICANT CHANGE UP (ref 10.3–14.5)
SARS-COV-2 RNA SPEC QL NAA+PROBE: SIGNIFICANT CHANGE UP
SODIUM SERPL-SCNC: 138 MMOL/L — SIGNIFICANT CHANGE UP (ref 135–145)
WBC # BLD: 12.34 K/UL — HIGH (ref 3.8–10.5)
WBC # FLD AUTO: 12.34 K/UL — HIGH (ref 3.8–10.5)

## 2023-03-10 PROCEDURE — 73502 X-RAY EXAM HIP UNI 2-3 VIEWS: CPT | Mod: 26,RT

## 2023-03-10 PROCEDURE — 70450 CT HEAD/BRAIN W/O DYE: CPT | Mod: 26,MG

## 2023-03-10 PROCEDURE — G1004: CPT

## 2023-03-10 PROCEDURE — 73564 X-RAY EXAM KNEE 4 OR MORE: CPT | Mod: 26,RT

## 2023-03-10 PROCEDURE — 73552 X-RAY EXAM OF FEMUR 2/>: CPT | Mod: 26,RT

## 2023-03-10 PROCEDURE — 99285 EMERGENCY DEPT VISIT HI MDM: CPT

## 2023-03-10 PROCEDURE — 71045 X-RAY EXAM CHEST 1 VIEW: CPT | Mod: 26

## 2023-03-10 PROCEDURE — 72192 CT PELVIS W/O DYE: CPT | Mod: 26,MG

## 2023-03-10 RX ORDER — MIRTAZAPINE 45 MG/1
7.5 TABLET, ORALLY DISINTEGRATING ORAL EVERY 24 HOURS
Refills: 0 | Status: DISCONTINUED | OUTPATIENT
Start: 2023-03-11 | End: 2023-03-17

## 2023-03-10 RX ORDER — ONDANSETRON 8 MG/1
4 TABLET, FILM COATED ORAL EVERY 8 HOURS
Refills: 0 | Status: DISCONTINUED | OUTPATIENT
Start: 2023-03-10 | End: 2023-03-17

## 2023-03-10 RX ORDER — ACETAMINOPHEN 500 MG
650 TABLET ORAL ONCE
Refills: 0 | Status: COMPLETED | OUTPATIENT
Start: 2023-03-10 | End: 2023-03-10

## 2023-03-10 RX ORDER — ACETAMINOPHEN 500 MG
650 TABLET ORAL EVERY 6 HOURS
Refills: 0 | Status: DISCONTINUED | OUTPATIENT
Start: 2023-03-10 | End: 2023-03-17

## 2023-03-10 RX ORDER — ENOXAPARIN SODIUM 100 MG/ML
30 INJECTION SUBCUTANEOUS EVERY 24 HOURS
Refills: 0 | Status: DISCONTINUED | OUTPATIENT
Start: 2023-03-10 | End: 2023-03-17

## 2023-03-10 RX ORDER — LOSARTAN POTASSIUM 100 MG/1
100 TABLET, FILM COATED ORAL EVERY 24 HOURS
Refills: 0 | Status: DISCONTINUED | OUTPATIENT
Start: 2023-03-11 | End: 2023-03-17

## 2023-03-10 RX ORDER — ATORVASTATIN CALCIUM 80 MG/1
10 TABLET, FILM COATED ORAL AT BEDTIME
Refills: 0 | Status: DISCONTINUED | OUTPATIENT
Start: 2023-03-10 | End: 2023-03-17

## 2023-03-10 RX ORDER — LIDOCAINE 4 G/100G
1 CREAM TOPICAL EVERY 24 HOURS
Refills: 0 | Status: DISCONTINUED | OUTPATIENT
Start: 2023-03-10 | End: 2023-03-17

## 2023-03-10 RX ORDER — LANOLIN ALCOHOL/MO/W.PET/CERES
3 CREAM (GRAM) TOPICAL AT BEDTIME
Refills: 0 | Status: DISCONTINUED | OUTPATIENT
Start: 2023-03-10 | End: 2023-03-17

## 2023-03-10 RX ADMIN — Medication 650 MILLIGRAM(S): at 18:13

## 2023-03-10 NOTE — PATIENT PROFILE ADULT - NSPROPTRIGHTBILLOFRIGHTS_GEN_A_NUR
Detail Level: Detailed Add 10893 Cpt? (Important Note: In 2017 The Use Of 23957 Is Being Tracked By Cms To Determine Future Global Period Reimbursement For Global Periods): yes Wound Evaluated By: Dr. Rachel Allen patient

## 2023-03-10 NOTE — H&P ADULT - PROBLEM SELECTOR PLAN 1
Patient presented with an unwitnessed fall today. Denied MATTY, head trauma, cardiac or seizure history, and fecal/urinary incontinence. Patient ambulates with cane at baseline. CTH - no acute intracranial pathology. CT pelvis - Acute nondisplaced comminuted intra-articular fracture right acetabulum. Also mildly displaced fracture inferior right pubic ramus. Lactate 0.9. CK 68  - Ortho consulted, appreciate recs   - PT consult  - Obtain U/A  - Obtain orthostatics  - Pain management: Tylenol 650mg q6 PO PRN, Morphine 2mg IVP q4 PRN, lidocaine patch  - Weight bearing/Fall precautions Patient presented with an unwitnessed fall today. Denied MATTY, head trauma, cardiac or seizure history, and fecal/urinary incontinence. Patient ambulates with cane at baseline. CTH - no acute intracranial pathology. CT pelvis - Acute nondisplaced comminuted intra-articular fracture right acetabulum. Also mildly displaced fracture inferior right pubic ramus. Lactate 0.9. CK 68  - Ortho consulted, appreciate recs   - PT consult  - Obtain U/A  - Obtain orthostatics  - Pain management: Tylenol 650mg q6 PO PRN, Morphine 2mg PO q8 PRN, lidocaine patch  - Weight bearing/Fall precautions

## 2023-03-10 NOTE — H&P ADULT - NSHPLABSRESULTS_GEN_ALL_CORE
LABS:                        12.6   12.34 )-----------( 238      ( 10 Mar 2023 18:15 )             37.6     03-10    138  |  103  |  16  ----------------------------<  110<H>  4.6   |  25  |  0.84    Ca    10.0      10 Mar 2023 18:15    TPro  6.6  /  Alb  3.8  /  TBili  0.5  /  DBili  x   /  AST  20  /  ALT  12  /  AlkPhos  71  03-10          MICROBIOLOGY:    RADIOLOGY & ADDITIONAL STUDIES:

## 2023-03-10 NOTE — ED PROVIDER NOTE - ATTENDING APP SHARED VISIT CONTRIBUTION OF CARE
89 y/o female w/ hx dementia, htn presents to ED with HHA for eval s/p unwitnessed fall.  Per aide, pt was sitting on bed eating, aide walked out of room then heard thump and found pt sitting on wood floor.  Unsure if she hit head.  Pt c/o R thigh pain.  No other complaints, states wants to go home.  Per aide, pt has been well, acting like self, eating/drinking, using bathroom normally.  No known recent fever, cough.  Has dementia and is at baseline per aide.  Pt has 24h HHA and walks with cane at baseline.  Denies asa/ ac use  vss  s1s2 lungs cta bl  abd soft nt nd +bs  R able to int/ext rotate  IMP- Fall, elderly female  xrays, CT head

## 2023-03-10 NOTE — ED ADULT NURSE REASSESSMENT NOTE - NS ED NURSE REASSESS COMMENT FT1
pt awake and confused.   no distress.  no sob   no chest pain  no n/v. primafit intact and pending urine sample.    HHA at bedside.

## 2023-03-10 NOTE — ED PROVIDER NOTE - CLINICAL SUMMARY MEDICAL DECISION MAKING FREE TEXT BOX
89 y/o female w/ hx dementia, htn presents to ED with HHA for eval s/p unwitnessed fall.  Per aide, pt was sitting on bed eating, aide walked out of room then heard thump and found pt sitting on wood floor.  Unsure if she hit head.  Pt c/o R thigh pain.  No other complaints, states wants to go home.  Per aide, pt has been well, acting like self, eating/drinking, using bathroom normally.  No known recent fever, cough.  Has dementia and is at baseline per aide.  Pt has 24h HHA and walks with cane at baseline.  Denies asa/ ac use.    Exam notable for R groin and R posterior distal thigh ttp with some pain with passive rom  Otherwise unrevealing trauma exam    Plan for basic labs, ekg, cxr, xr pelvis, xr r hip/femur/knee  Pain control, re-eval

## 2023-03-10 NOTE — ED ADULT NURSE NOTE - OBJECTIVE STATEMENT
patient presents to ED with mechanical fall, states she tripped over at home. denies LOC, head/chest hit, sob, dizziness. patient presents to ED with mechanical fall, states she tripped over at home. denies LOC, head/chest hit, sob, dizziness. A&Ox2 (name, place). stable at bed. hx of dementia, denies blood thinners

## 2023-03-10 NOTE — ED PROVIDER NOTE - PHYSICAL EXAMINATION
CONSTITUTIONAL: Awake, alert.  Nontoxic, no acute distress.    HEAD: Normocephalic, atraumatic.    EYES: Conjunctivae clear without exudates or hemorrhage. Sclera is non-icteric.  No raccoon eyes    ENT: Normal appearing external ears, nose, mucous membranes moist.  No richards signs    NECK: supple, trachea midline.  No midline or paraspinal ttp    HEART:  Normal rate, regular rhythm.  Heart sounds S1, S2.  No murmurs, rubs or gallops.    LUNGS:  No acute respiratory distress.  Non-tachypneic and non-labored.  Lungs are clear bilaterally with good aeration.  No wheezing, rales, rhonchi.    ABDOMEN: Normal appearing skin without lesions, rashes.  Normal bowel sounds x 4.  Soft, non-distended, non-tender in all four quadrants. No rebound or guarding. No hernias or masses palpable.  No pulsatile abdominal mass.   No CVA tenderness b/l.    BACK: no obvious deformity. no midline or paraspinal ttp    MUSCULOSKELETAL:  Normal appearing extremities without obvious deformity, rash, ecchymosis, erythema.  No swelling.  Warm. + ttp to R groin and posterior aspect distal thigh.   Did not want to lift RLE, some pain with passive rom r hip and r knee.  FROM otherwise b/l upper ext and LLE.  5/5  strength.  Sensation and motor function grossly intact.  Strong equal peripheral pulses b/l.   Cap refill < 2 b/l upper and lower ext.  All compartments soft.    SKIN: Skin in warm, dry and intact without rashes or lesions.  Appropriate color for ethnicity.    NEUROLOGICAL:  Patient is alert, oriented to name.  Did not know location, year, or president (baseline per aide).  Face symmetric with equal sensation to light touch throughout, PERRL, EOMI, no nystagmus, no tongue deviation, intact strength upon turning head against resistance b/l, No gross motor deficit, 5/5 strength throughout, no gross sensory loss to light touch b/l upper and lower ext, normal movement.

## 2023-03-10 NOTE — ED ADULT NURSE NOTE - NSIMPLEMENTINTERV_GEN_ALL_ED
Implemented All Fall with Harm Risk Interventions:  [place] to call system. Call bell, personal items and telephone within reach. Instruct patient to call for assistance. Room bathroom lighting operational. Non-slip footwear when patient is off stretcher. Physically safe environment: no spills, clutter or unnecessary equipment. Stretcher in lowest position, wheels locked, appropriate side rails in place. Provide visual cue, wrist band, yellow gown, etc. Monitor gait and stability. Monitor for mental status changes and reorient to person, place, and time. Review medications for side effects contributing to fall risk. Reinforce activity limits and safety measures with patient and family. Provide visual clues: red socks.

## 2023-03-10 NOTE — H&P ADULT - NSHPSOCIALHISTORY_GEN_ALL_CORE
done Lives alone. Has 24/7 HHA. Ambulates at baseline with cane. Lives alone. Has 24/7 HHA. Ambulates at baseline with cane. Medical decisions are made by  Enmanuel Shine: [phone]

## 2023-03-10 NOTE — H&P ADULT - HISTORY OF PRESENT ILLNESS
89F w/ PMHx of HTN, dementia, s/p R knee replacement presents after unwitnessed fall. Collateral obtained from HHA at bedside, patient was sitting in bed and eating and HHA had walked out of the room. She then heard a loud thump and found the patient sitting on the floor. Denies cardiac and seizure history. Denied tongue injury, urinary or fecal incontinence during this fall. Patient denied aspirin or AC use.   HPI:  89F w/ PMHx of HTN, HLD, dementia, s/p R knee replacement presents to [facility] ED after an unwitnessed fall at home. Collateral obtained from HHA at bedside, patient was sitting up in bed and eating food. The HHA walked to another room and heard a loud noise soon after. Upon arrival, patient was found sitting on the floor. Denied seizures, tongue injury, urinary or fecal incontinence during this fall. Unknown if patient LOC or had head trauma. Patient does not have known cardiac history. Patient is no on ASA or anticoagulation. Patient normally ambulates with a cane. ROS limited by underlying dementia. Patient is currently endorsing hip pain, however denies headaches, acute chest pain, SOB, palpitations, abdominal pain, genitourinary symptoms.    ED Course:  Vitals: 98 F, HR 82, /77, RR 18(99%) on Room air  Labs: WBC 12.3, Neutrophils 82%, BUN 16, Cr 0.84, Lactate 0.9, Ck 68  Imaging: CTH - no acute intracranial pathology. CT pelvis - Acute nondisplaced comminuted intra-articular fracture right acetabulum.  EKG: NSR w/ 1 AVB, normal axis, Q waves V2  Consults: Ortho  Interventions: Tylenol 650mg PO x1

## 2023-03-10 NOTE — H&P ADULT - ASSESSMENT
89F w/ PMHx of HTN, dementia, s/p R knee replacement presents after unwitnessed fall.  89F w/ PMHx of HTN, HLD, dementia, s/p R knee replacement presents to [facility] ED after an unwitnessed fall at home.

## 2023-03-10 NOTE — ED ADULT TRIAGE NOTE - CHIEF COMPLAINT QUOTE
pt BIBA with aide for trip and fall at home. Fall was unwitnessed but pt reports to EMS that she tripped and fell. Upon arrival, pt is A and O x 1 to self. Per EMS, pt was A and O x 3 at home. Aide reports she has hx of dementia and she is confused at baseline. + hit head, denies LOC or blood thinners. Pt also c/o R thigh pain. FS 92 in the field.

## 2023-03-10 NOTE — H&P ADULT - PROBLEM SELECTOR PLAN 4
Patient with known dementia. Baseline AAOx1. Per chart review, legal  makes medical decisions. On home mirtazapine 7.5mg qd  - c/w home med mirtazapine 7.5mg qd  - Obtain collateral from  Enmanuel Shine: [phone] Patient with known dementia. Baseline AAOx1. Per chart review, legal  makes medical decisions. On home mirtazapine 7.5mg qd  - c/w home med mirtazapine 7.5mg qd  - Obtain collateral from  Enmanuel Shine: [phone]

## 2023-03-10 NOTE — H&P ADULT - PROBLEM SELECTOR PLAN 6
Plan:  F: None  E: replete K<4, Mg<2  N: regular  VTE Prophylaxis: Lovenox  GI: home pepcid  C: Full Code per previous chart review from   D: CLARA

## 2023-03-10 NOTE — ED PROVIDER NOTE - OBJECTIVE STATEMENT
89 y/o female w/ hx dementia, htn presents to ED with HHA for eval s/p unwitnessed fall.  Per aide, pt was sitting on bed eating, aide walked out of room then heard thump and found pt sitting on wood floor.  Unsure if she hit head.  Pt c/o R thigh pain.  No other complaints, states wants to go home.  Per aide, pt has been well, acting like self, eating/drinking, using bathroom normally.  No known recent fever, cough.  Has dementia and is at baseline per aide.  Pt has 24h HHA and walks with cane at baseline.  Denies asa/ ac use.

## 2023-03-10 NOTE — PATIENT PROFILE ADULT - FALL HARM RISK - HARM RISK INTERVENTIONS
Assistance with ambulation/Assistance OOB with selected safe patient handling equipment/Communicate Risk of Fall with Harm to all staff/Discuss with provider need for PT consult/Monitor gait and stability/Provide patient with walking aids - walker, cane, crutches/Tailored Fall Risk Interventions/Bed in lowest position, wheels locked, appropriate side rails in place/Call bell, personal items and telephone in reach/Instruct patient to call for assistance before getting out of bed or chair/Non-slip footwear when patient is out of bed/[place] to call system/Physically safe environment - no spills, clutter or unnecessary equipment/Purposeful Proactive Rounding/Room/bathroom lighting operational, light cord in reach

## 2023-03-10 NOTE — H&P ADULT - PROBLEM SELECTOR PLAN 2
Presented with WBC 12K. No additional SIRS criteria. Likely reactive 2/2 fall  - Trend WBC  - Monitor off ABx

## 2023-03-10 NOTE — ED PROVIDER NOTE - NS ED ATTENDING STATEMENT MOD
This was a shared visit with the GLADIS. I reviewed and verified the documentation and independently performed the documented:

## 2023-03-10 NOTE — H&P ADULT - NSHPPHYSICALEXAM_GEN_ALL_CORE
T(C): 36.2 (03-10-23 @ 16:10), Max: 36.2 (03-10-23 @ 16:10)  HR: 92 (03-10-23 @ 20:30) (75 - 92)  BP: 146/68 (03-10-23 @ 20:30) (128/77 - 146/68)  RR: 18 (03-10-23 @ 20:30) (18 - 18)  SpO2: 96% (03-10-23 @ 20:30) (96% - 97%)    General: NAD, laying in bed, speaking in full sentences  HEENT: head NC/AT, no conjunctival injection, EOMI, MMM  Neck: supple, no JVD  Cardio: RRR, +S1/S2, no M/R/G  Resp: lungs CTAB, no cough/wheezes/rales/rhonchi  Abdo: soft, NT, ND, +bowel sounds x4, no organomegaly or palpable mass    Extremities: WWP, no edema/cyanosis/clubbing   Vasc: 2+ radial and DP pulses b/l  Neuro: A&Ox3  Psych: speech non-pressured, thoughts goal-oriented  Skin: dry, intact, no visible jaundice   MSK: no joint swelling T(C): 36.2 (03-10-23 @ 16:10), Max: 36.2 (03-10-23 @ 16:10)  HR: 92 (03-10-23 @ 20:30) (75 - 92)  BP: 146/68 (03-10-23 @ 20:30) (128/77 - 146/68)  RR: 18 (03-10-23 @ 20:30) (18 - 18)  SpO2: 96% (03-10-23 @ 20:30) (96% - 97%)    General: NAD, laying in bed, AAOx1-2, screaming in pain  HEENT: head NC/AT, no conjunctival injection, EOMI, dry MM  Neck: supple, no JVD  Cardio: RRR, +S1/S2, no M/R/G  Resp: lungs CTAB, no cough/wheezes/rales/rhonchi on room air without increased WOB  Abdo: soft, NT, ND, +bowel sounds x4, no organomegaly or palpable mass    Extremities: WWP, no edema/cyanosis/clubbing   Vasc: 2+ radial and DP pulses b/l  Neuro: A&Ox1-2, pain upon manipulation of LE  Skin: dry, intact, no visible jaundice   MSK: no joint swelling

## 2023-03-10 NOTE — H&P ADULT - PROBLEM SELECTOR PLAN 5
Plan:  F: None  E: replete K<4, Mg<2  N: regular  VTE Prophylaxis: Lovenox  GI: home pepcid  C: Full Code per previous chart review from   D: CLARA Hx of HLD. On home lipitor 10mg   - c/w home lipitor 10mg

## 2023-03-10 NOTE — H&P ADULT - PROBLEM SELECTOR PLAN 3
Known history of HTN. On home Telmisartan 80mg qd  - c/w therapeutic interchange losartan 100mg qdaily.

## 2023-03-11 DIAGNOSIS — E78.5 HYPERLIPIDEMIA, UNSPECIFIED: ICD-10-CM

## 2023-03-11 PROBLEM — F03.90 UNSPECIFIED DEMENTIA, UNSPECIFIED SEVERITY, WITHOUT BEHAVIORAL DISTURBANCE, PSYCHOTIC DISTURBANCE, MOOD DISTURBANCE, AND ANXIETY: Chronic | Status: ACTIVE | Noted: 2022-09-07

## 2023-03-11 PROBLEM — F03.90 UNSPECIFIED DEMENTIA WITHOUT BEHAVIORAL DISTURBANCE: Chronic | Status: ACTIVE | Noted: 2022-09-07

## 2023-03-11 LAB
A1C WITH ESTIMATED AVERAGE GLUCOSE RESULT: 5.4 % — SIGNIFICANT CHANGE UP (ref 4–5.6)
ALBUMIN SERPL ELPH-MCNC: 3.3 G/DL — SIGNIFICANT CHANGE UP (ref 3.3–5)
ALP SERPL-CCNC: 65 U/L — SIGNIFICANT CHANGE UP (ref 40–120)
ALT FLD-CCNC: 13 U/L — SIGNIFICANT CHANGE UP (ref 10–45)
ANION GAP SERPL CALC-SCNC: 9 MMOL/L — SIGNIFICANT CHANGE UP (ref 5–17)
APPEARANCE UR: CLEAR — SIGNIFICANT CHANGE UP
AST SERPL-CCNC: 22 U/L — SIGNIFICANT CHANGE UP (ref 10–40)
BASOPHILS # BLD AUTO: 0.04 K/UL — SIGNIFICANT CHANGE UP (ref 0–0.2)
BASOPHILS NFR BLD AUTO: 0.6 % — SIGNIFICANT CHANGE UP (ref 0–2)
BILIRUB SERPL-MCNC: 0.7 MG/DL — SIGNIFICANT CHANGE UP (ref 0.2–1.2)
BILIRUB UR-MCNC: NEGATIVE — SIGNIFICANT CHANGE UP
BUN SERPL-MCNC: 17 MG/DL — SIGNIFICANT CHANGE UP (ref 7–23)
CALCIUM SERPL-MCNC: 9.4 MG/DL — SIGNIFICANT CHANGE UP (ref 8.4–10.5)
CHLORIDE SERPL-SCNC: 103 MMOL/L — SIGNIFICANT CHANGE UP (ref 96–108)
CO2 SERPL-SCNC: 24 MMOL/L — SIGNIFICANT CHANGE UP (ref 22–31)
COLOR SPEC: YELLOW — SIGNIFICANT CHANGE UP
CREAT SERPL-MCNC: 0.75 MG/DL — SIGNIFICANT CHANGE UP (ref 0.5–1.3)
DIFF PNL FLD: NEGATIVE — SIGNIFICANT CHANGE UP
EGFR: 72 ML/MIN/1.73M2 — SIGNIFICANT CHANGE UP
EOSINOPHIL # BLD AUTO: 0.06 K/UL — SIGNIFICANT CHANGE UP (ref 0–0.5)
EOSINOPHIL NFR BLD AUTO: 1 % — SIGNIFICANT CHANGE UP (ref 0–6)
ESTIMATED AVERAGE GLUCOSE: 108 MG/DL — SIGNIFICANT CHANGE UP (ref 68–114)
FOLATE SERPL-MCNC: 9.1 NG/ML — SIGNIFICANT CHANGE UP
GLUCOSE SERPL-MCNC: 92 MG/DL — SIGNIFICANT CHANGE UP (ref 70–99)
GLUCOSE UR QL: NEGATIVE — SIGNIFICANT CHANGE UP
HCT VFR BLD CALC: 34.7 % — SIGNIFICANT CHANGE UP (ref 34.5–45)
HGB BLD-MCNC: 11.5 G/DL — SIGNIFICANT CHANGE UP (ref 11.5–15.5)
IMM GRANULOCYTES NFR BLD AUTO: 0.3 % — SIGNIFICANT CHANGE UP (ref 0–0.9)
KETONES UR-MCNC: ABNORMAL MG/DL
LEUKOCYTE ESTERASE UR-ACNC: NEGATIVE — SIGNIFICANT CHANGE UP
LYMPHOCYTES # BLD AUTO: 1.23 K/UL — SIGNIFICANT CHANGE UP (ref 1–3.3)
LYMPHOCYTES # BLD AUTO: 19.7 % — SIGNIFICANT CHANGE UP (ref 13–44)
MAGNESIUM SERPL-MCNC: 1.8 MG/DL — SIGNIFICANT CHANGE UP (ref 1.6–2.6)
MCHC RBC-ENTMCNC: 33.1 GM/DL — SIGNIFICANT CHANGE UP (ref 32–36)
MCHC RBC-ENTMCNC: 34.4 PG — HIGH (ref 27–34)
MCV RBC AUTO: 103.9 FL — HIGH (ref 80–100)
MONOCYTES # BLD AUTO: 0.44 K/UL — SIGNIFICANT CHANGE UP (ref 0–0.9)
MONOCYTES NFR BLD AUTO: 7 % — SIGNIFICANT CHANGE UP (ref 2–14)
NEUTROPHILS # BLD AUTO: 4.46 K/UL — SIGNIFICANT CHANGE UP (ref 1.8–7.4)
NEUTROPHILS NFR BLD AUTO: 71.4 % — SIGNIFICANT CHANGE UP (ref 43–77)
NITRITE UR-MCNC: NEGATIVE — SIGNIFICANT CHANGE UP
NRBC # BLD: 0 /100 WBCS — SIGNIFICANT CHANGE UP (ref 0–0)
PH UR: 6.5 — SIGNIFICANT CHANGE UP (ref 5–8)
PHOSPHATE SERPL-MCNC: 3.3 MG/DL — SIGNIFICANT CHANGE UP (ref 2.5–4.5)
PLATELET # BLD AUTO: 182 K/UL — SIGNIFICANT CHANGE UP (ref 150–400)
POTASSIUM SERPL-MCNC: 4.4 MMOL/L — SIGNIFICANT CHANGE UP (ref 3.5–5.3)
POTASSIUM SERPL-SCNC: 4.4 MMOL/L — SIGNIFICANT CHANGE UP (ref 3.5–5.3)
PROT SERPL-MCNC: 6.2 G/DL — SIGNIFICANT CHANGE UP (ref 6–8.3)
PROT UR-MCNC: NEGATIVE MG/DL — SIGNIFICANT CHANGE UP
RBC # BLD: 3.34 M/UL — LOW (ref 3.8–5.2)
RBC # FLD: 12.9 % — SIGNIFICANT CHANGE UP (ref 10.3–14.5)
SODIUM SERPL-SCNC: 136 MMOL/L — SIGNIFICANT CHANGE UP (ref 135–145)
SP GR SPEC: 1.02 — SIGNIFICANT CHANGE UP (ref 1–1.03)
UROBILINOGEN FLD QL: 1 E.U./DL — SIGNIFICANT CHANGE UP
VIT B12 SERPL-MCNC: 826 PG/ML — SIGNIFICANT CHANGE UP (ref 232–1245)
WBC # BLD: 6.25 K/UL — SIGNIFICANT CHANGE UP (ref 3.8–10.5)
WBC # FLD AUTO: 6.25 K/UL — SIGNIFICANT CHANGE UP (ref 3.8–10.5)

## 2023-03-11 RX ORDER — MAGNESIUM SULFATE 500 MG/ML
1 VIAL (ML) INJECTION ONCE
Refills: 0 | Status: COMPLETED | OUTPATIENT
Start: 2023-03-11 | End: 2023-03-11

## 2023-03-11 RX ORDER — POLYETHYLENE GLYCOL 3350 17 G/17G
17 POWDER, FOR SOLUTION ORAL DAILY
Refills: 0 | Status: DISCONTINUED | OUTPATIENT
Start: 2023-03-11 | End: 2023-03-14

## 2023-03-11 RX ORDER — MORPHINE SULFATE 50 MG/1
2 CAPSULE, EXTENDED RELEASE ORAL EVERY 6 HOURS
Refills: 0 | Status: DISCONTINUED | OUTPATIENT
Start: 2023-03-11 | End: 2023-03-17

## 2023-03-11 RX ORDER — SENNA PLUS 8.6 MG/1
2 TABLET ORAL AT BEDTIME
Refills: 0 | Status: DISCONTINUED | OUTPATIENT
Start: 2023-03-11 | End: 2023-03-14

## 2023-03-11 RX ADMIN — Medication 100 GRAM(S): at 16:05

## 2023-03-11 RX ADMIN — LIDOCAINE 1 PATCH: 4 CREAM TOPICAL at 12:03

## 2023-03-11 RX ADMIN — SENNA PLUS 2 TABLET(S): 8.6 TABLET ORAL at 21:48

## 2023-03-11 RX ADMIN — ATORVASTATIN CALCIUM 10 MILLIGRAM(S): 80 TABLET, FILM COATED ORAL at 21:48

## 2023-03-11 RX ADMIN — ENOXAPARIN SODIUM 30 MILLIGRAM(S): 100 INJECTION SUBCUTANEOUS at 06:23

## 2023-03-11 RX ADMIN — POLYETHYLENE GLYCOL 3350 17 GRAM(S): 17 POWDER, FOR SOLUTION ORAL at 11:08

## 2023-03-11 RX ADMIN — Medication 650 MILLIGRAM(S): at 11:27

## 2023-03-11 RX ADMIN — Medication 650 MILLIGRAM(S): at 00:57

## 2023-03-11 RX ADMIN — Medication 1 TABLET(S): at 11:09

## 2023-03-11 RX ADMIN — LIDOCAINE 1 PATCH: 4 CREAM TOPICAL at 07:14

## 2023-03-11 RX ADMIN — LIDOCAINE 1 PATCH: 4 CREAM TOPICAL at 00:58

## 2023-03-11 RX ADMIN — MIRTAZAPINE 7.5 MILLIGRAM(S): 45 TABLET, ORALLY DISINTEGRATING ORAL at 19:07

## 2023-03-11 RX ADMIN — Medication 650 MILLIGRAM(S): at 12:25

## 2023-03-11 RX ADMIN — LOSARTAN POTASSIUM 100 MILLIGRAM(S): 100 TABLET, FILM COATED ORAL at 06:23

## 2023-03-11 RX ADMIN — Medication 650 MILLIGRAM(S): at 01:57

## 2023-03-11 NOTE — PROGRESS NOTE ADULT - SUBJECTIVE AND OBJECTIVE BOX
Awake and alert VS stable Afeb In RR Good BS ant Abd is soft No edema  Pain well controlled overnight  Labs ok

## 2023-03-11 NOTE — PROGRESS NOTE ADULT - PROBLEM SELECTOR PLAN 1
Patient presented with an unwitnessed fall today. Denied MATTY, head trauma, cardiac or seizure history, and fecal/urinary incontinence. Patient ambulates with cane at baseline. CTH - no acute intracranial pathology. CT pelvis - Acute nondisplaced comminuted intra-articular fracture right acetabulum. Also mildly displaced fracture inferior right pubic ramus. Lactate 0.9. CK 68  - Ortho consulted, appreciate recs   - PT consult  - Obtain U/A  - Obtain orthostatics  - Pain management: Tylenol 650mg q6 PO PRN, Morphine 2mg PO q8 PRN, lidocaine patch  - Weight bearing/Fall precautions Patient presented with an unwitnessed fall today. Denied MATTY, head trauma, cardiac or seizure history, and fecal/urinary incontinence. Patient ambulates with cane at baseline. CTH - no acute intracranial pathology. CT pelvis - Acute nondisplaced comminuted intra-articular fracture right acetabulum. Also mildly displaced fracture inferior right pubic ramus. Lactate 0.9. CK 68  - Ortho consulted, appreciate recs   - PT recommending TRACE  - Obtain U/A  - Obtain orthostatics  - Pain management: Tylenol 650mg q6 PO PRN, Morphine 2mg PO q8 PRN, lidocaine patch  - Fall precautions  - per ortho no surgical intervention at this time  - Toe-Touch WB RLE  - F/u outpatient with ortho

## 2023-03-11 NOTE — CONSULT NOTE ADULT - ASSESSMENT
[facility]    89 y o F w/ PMHx of HTN, HLD, dementia, s/p R knee replacement presents to [facility] ED after an unwitnessed fall at home.      Problem/Plan - 1:  ·  Problem: Fall.   ·  Plan: Patient presented with an unwitnessed fall today. Denied MATTY, head trauma, cardiac or seizure history, and fecal/urinary incontinence. Patient ambulates with cane at baseline. CTH - no acute intracranial pathology. CT pelvis -  Also mildly displaced fracture inferior right pubic ramus/acetabular fx .- -Ortho consulted, appreciate recs   - PM&R consult  - Obtain U/A  - Obtain orthostatics  - Pain management: Tylenol 650mg q6 PO PRN, Morphine 2mg PO q8 PRN, lidocaine patch  - Weight bearing/Fall precautions.    Problem/Plan - 2:  ·  Problem: Leukocytosis.   ·  Plan: Presented with WBC 12K. No additional SIRS criteria. Likely reactive 2/2 fall  - Trend WBC  - Monitor off ABx.    Problem/Plan - 3:  ·  Problem: HTN (hypertension).   ·  Plan: Known history of HTN. On home Telmisartan 80mg qd  - c/w therapeutic interchange losartan 100mg qdaily.    Problem/Plan - 4:  ·  Problem: Dementia.   ·  Plan: Patient with known dementia. Baseline AAOx1. Per chart review, legal  makes medical decisions. On home mirtazapine 7.5mg qd  - c/w home med mirtazapine 7.5mg qd  - Obtain collateral from  [facility]: [phone].    Problem/Plan - 5:  ·  Problem: Hyperlipemia.   ·  Plan: Hx of HLD. On home lipitor 10mg   - c/w home lipitor 10mg.    Problem/Plan - 6:  ·  Problem: Need for prophylactic measure.   ·  Plan: Plan:  F: None  E: replete K<4, Mg<2  N: regular  VTE Prophylaxis: Lovenox  GI: home pepcid  C: Full Code per previous chart review from   D: RMF.

## 2023-03-11 NOTE — PROGRESS NOTE ADULT - SUBJECTIVE AND OBJECTIVE BOX
**INCOMPLETE NOTE    OVERNIGHT EVENTS:    SUBJECTIVE:  Patient seen and examined at bedside.    Vital Signs Last 12 Hrs  T(F): 97.8 (03-11-23 @ 05:54), Max: 98 (03-10-23 @ 23:32)  HR: 70 (03-11-23 @ 05:54) (70 - 92)  BP: 118/66 (03-11-23 @ 05:54) (118/66 - 159/77)  BP(mean): --  RR: 17 (03-11-23 @ 05:54) (17 - 18)  SpO2: 95% (03-11-23 @ 05:54) (95% - 99%)  I&O's Summary      PHYSICAL EXAM:  Constitutional: NAD, comfortable in bed.  HEENT: NC/AT, PERRLA, EOMI, no conjunctival pallor or scleral icterus, MMM  Neck: Supple, no JVD  Respiratory: CTA B/L. No w/r/r.   Cardiovascular: RRR, normal S1 and S2, no m/r/g.   Gastrointestinal: +BS, soft NTND, no guarding or rebound tenderness, no palpable masses   Extremities: wwp; no cyanosis, clubbing or edema.   Vascular: Pulses equal and strong throughout.   Neurological: AAOx3, no CN deficits, strength and sensation intact throughout.   Skin: No gross skin abnormalities or rashes        LABS:                        12.6   12.34 )-----------( 238      ( 10 Mar 2023 18:15 )             37.6     03-10    138  |  103  |  16  ----------------------------<  110<H>  4.6   |  25  |  0.84    Ca    10.0      10 Mar 2023 18:15    TPro  6.6  /  Alb  3.8  /  TBili  0.5  /  DBili  x   /  AST  20  /  ALT  12  /  AlkPhos  71  03-10            RADIOLOGY & ADDITIONAL TESTS:    MEDICATIONS  (STANDING):  atorvastatin 10 milliGRAM(s) Oral at bedtime  enoxaparin Injectable 30 milliGRAM(s) SubCutaneous every 24 hours  lidocaine   4% Patch 1 Patch Transdermal every 24 hours  losartan 100 milliGRAM(s) Oral every 24 hours  mirtazapine 7.5 milliGRAM(s) Oral every 24 hours  multivitamin 1 Tablet(s) Oral daily  polyethylene glycol 3350 17 Gram(s) Oral daily  senna 2 Tablet(s) Oral at bedtime    MEDICATIONS  (PRN):  acetaminophen     Tablet .. 650 milliGRAM(s) Oral every 6 hours PRN Temp greater or equal to 38C (100.4F), Mild Pain (1 - 3)  aluminum hydroxide/magnesium hydroxide/simethicone Suspension 30 milliLiter(s) Oral every 4 hours PRN Dyspepsia  melatonin 3 milliGRAM(s) Oral at bedtime PRN Insomnia  morphine   Solution 2 milliGRAM(s) Oral every 6 hours PRN Severe Pain (7 - 10)  ondansetron Injectable 4 milliGRAM(s) IV Push every 8 hours PRN Nausea and/or Vomiting   OVERNIGHT EVENTS: No acute overnight events.    SUBJECTIVE: Patient seen and examined at bedside. Unable to obtain ROS given dementia, pt denies pain.    Vital Signs Last 12 Hrs  T(F): 97.8 (03-11-23 @ 05:54), Max: 98 (03-10-23 @ 23:32)  HR: 70 (03-11-23 @ 05:54) (70 - 92)  BP: 118/66 (03-11-23 @ 05:54) (118/66 - 159/77)  BP(mean): --  RR: 17 (03-11-23 @ 05:54) (17 - 18)  SpO2: 95% (03-11-23 @ 05:54) (95% - 99%)  I&O's Summary      PHYSICAL EXAM:  Constitutional: NAD, comfortable in bed.  HEENT: NC/AT, PERRLA, EOMI, no conjunctival pallor or scleral icterus, MMM  Neck: Supple, no JVD  Respiratory: CTA B/L. No w/r/r.   Cardiovascular: RRR, normal S1 and S2, no m/r/g.   Gastrointestinal: soft NTND, no guarding or rebound tenderness, no palpable masses   Extremities: wwp; no cyanosis, clubbing or edema.   Neurological: AAOx1        LABS:                        12.6   12.34 )-----------( 238      ( 10 Mar 2023 18:15 )             37.6     03-10    138  |  103  |  16  ----------------------------<  110<H>  4.6   |  25  |  0.84    Ca    10.0      10 Mar 2023 18:15    TPro  6.6  /  Alb  3.8  /  TBili  0.5  /  DBili  x   /  AST  20  /  ALT  12  /  AlkPhos  71  03-10            RADIOLOGY & ADDITIONAL TESTS:    MEDICATIONS  (STANDING):  atorvastatin 10 milliGRAM(s) Oral at bedtime  enoxaparin Injectable 30 milliGRAM(s) SubCutaneous every 24 hours  lidocaine   4% Patch 1 Patch Transdermal every 24 hours  losartan 100 milliGRAM(s) Oral every 24 hours  mirtazapine 7.5 milliGRAM(s) Oral every 24 hours  multivitamin 1 Tablet(s) Oral daily  polyethylene glycol 3350 17 Gram(s) Oral daily  senna 2 Tablet(s) Oral at bedtime    MEDICATIONS  (PRN):  acetaminophen     Tablet .. 650 milliGRAM(s) Oral every 6 hours PRN Temp greater or equal to 38C (100.4F), Mild Pain (1 - 3)  aluminum hydroxide/magnesium hydroxide/simethicone Suspension 30 milliLiter(s) Oral every 4 hours PRN Dyspepsia  melatonin 3 milliGRAM(s) Oral at bedtime PRN Insomnia  morphine   Solution 2 milliGRAM(s) Oral every 6 hours PRN Severe Pain (7 - 10)  ondansetron Injectable 4 milliGRAM(s) IV Push every 8 hours PRN Nausea and/or Vomiting

## 2023-03-11 NOTE — PROGRESS NOTE ADULT - ASSESSMENT
89F w/ PMHx of HTN, HLD, dementia, s/p R knee replacement presents to [facility] ED after an unwitnessed fall at home.

## 2023-03-11 NOTE — PHYSICAL THERAPY INITIAL EVALUATION ADULT - IMPAIRMENTS FOUND, PT EVAL
aerobic capacity/endurance/arousal, attention, and cognition/cognitive impairment/gait, locomotion, and balance/muscle strength/poor safety awareness

## 2023-03-11 NOTE — PROGRESS NOTE ADULT - PROBLEM SELECTOR PLAN 4
Patient with known dementia. Baseline AAOx1. Per chart review, legal  makes medical decisions. On home mirtazapine 7.5mg qd  - c/w home med mirtazapine 7.5mg qd  - Obtain collateral from  [facility]: [phone]

## 2023-03-11 NOTE — PHYSICAL THERAPY INITIAL EVALUATION ADULT - PERTINENT HX OF CURRENT PROBLEM, REHAB EVAL
89F w/ PMHx of HTN, HLD, dementia, s/p R knee replacement presents to [facility] ED after an unwitnessed fall at home. Collateral obtained from HHA at bedside, patient was sitting up in bed and eating food. The HHA walked to another room and heard a loud noise soon after. Upon arrival, patient was found sitting on the floor. Denied seizures, tongue injury, urinary or fecal incontinence during this fall. Unknown if patient LOC or had head trauma. Patient does not have known cardiac history. Patient is no on ASA or anticoagulation. Patient normally ambulates with a cane. ROS limited by underlying dementia. Patient is currently endorsing hip pain, however denies headaches, acute chest pain, SOB, palpitations, abdominal pain, genitourinary symptoms.

## 2023-03-11 NOTE — CONSULT NOTE ADULT - SUBJECTIVE AND OBJECTIVE BOX
Orthopaedic Surgery Consult Note    For Surgeon: Dr. Begum    HPI:  89F w/ PMHx of HTN, HLD, dementia, s/p R knee replacement presents to [facility] ED after an unwitnessed fall at home. Collateral obtained from HHA at bedside, patient was sitting up in bed and eating food. The HHA walked to another room and heard a loud noise soon after. Upon arrival, patient was found sitting on the floor. Denied seizures, tongue injury, urinary or fecal incontinence during this fall. Unknown if patient LOC or had head trauma. Patient does not have known cardiac history. Patient is no on ASA or anticoagulation. Patient normally ambulates with a cane. ROS limited by underlying dementia. Patient is currently endorsing hip pain, however denies headaches, acute chest pain, SOB, palpitations, abdominal pain, genitourinary symptoms.    Ortho consulted for R Acetabular and Pubic Ramus Fxs    ED Course:  Vitals: 98 F, HR 82, /77, RR 18(99%) on Room air  Labs: WBC 12.3, Neutrophils 82%, BUN 16, Cr 0.84, Lactate 0.9, Ck 68  Imaging: CTH - no acute intracranial pathology. CT pelvis - Acute nondisplaced comminuted intra-articular fracture right acetabulum.  EKG: NSR w/ 1 AVB, normal axis, Q waves V2  Consults: Ortho  Interventions: Tylenol 650mg PO x1   (10 Mar 2023 22:02)      Vital Signs Last 24 Hrs  T(C): 36.7 (10 Mar 2023 23:32), Max: 36.7 (10 Mar 2023 23:32)  T(F): 98 (10 Mar 2023 23:32), Max: 98 (10 Mar 2023 23:32)  HR: 82 (10 Mar 2023 23:32) (75 - 92)  BP: 159/77 (10 Mar 2023 23:32) (128/77 - 159/77)  BP(mean): --  RR: 18 (10 Mar 2023 23:32) (18 - 18)  SpO2: 99% (10 Mar 2023 23:32) (96% - 99%)    Parameters below as of 10 Mar 2023 23:32  Patient On (Oxygen Delivery Method): room air    Physical Exam:  AAOx1 to self only, NAD  +Pelvic compression test  Moving all 4 extremities spontaneously  Unable to cooperate with neuro exam    Imaging: CT showing comminuted nondisplaced acetabular fx and inferior pubic ramus fxs    A/P: 89yFemale w/R nondisplaced acetabular fx and inferior pubic ramus fxs  - Given minimal displacement/stepoff of articular surface and age, will elect to treat nonoperatively at this time  - Pain control  - PT Eval  - Toe-Touch WB RLE  - F/u outpatient  - May require TRACE however also has 24hr HHA at home  - Discussed with Dr. Kel Redd, PGY-3  Orthopedic Surgery  
    Patient is a 89y old  Female who presents with a chief complaint of     HPI:  HPI:  89F w/ PMHx of HTN, HLD, dementia, s/p R knee replacement presents to [facility] ED after an unwitnessed fall at home. Collateral obtained from HHA at bedside, patient was sitting up in bed and eating food. The HHA walked to another room and heard a loud noise soon after. Upon arrival, patient was found sitting on the floor. Denied seizures, tongue injury, urinary or fecal incontinence during this fall. Unknown if patient LOC or had head trauma. Patient does not have known cardiac history. Patient is no on ASA or anticoagulation. Patient normally ambulates with a cane. ROS limited by underlying dementia. Patient is currently endorsing hip pain, however denies headaches, acute chest pain, SOB, palpitations, abdominal pain, genitourinary symptoms.    ED Course:  Vitals: 98 F, HR 82, /77, RR 18(99%) on Room air  Labs: WBC 12.3, Neutrophils 82%, BUN 16, Cr 0.84, Lactate 0.9, Ck 68  Imaging: CTH - no acute intracranial pathology. CT pelvis - Acute nondisplaced comminuted intra-articular fracture right acetabulum.  EKG: NSR w/ 1 AVB, normal axis, Q waves V2  Consults: Ortho  Interventions: Tylenol 650mg PO x1   (10 Mar 2023 22:02)    PAST MEDICAL & SURGICAL HISTORY:  Dementia      HTN (hypertension)      S/P total knee replacement, right        MEDICATIONS  (STANDING):  atorvastatin 10 milliGRAM(s) Oral at bedtime  enoxaparin Injectable 30 milliGRAM(s) SubCutaneous every 24 hours  lidocaine   4% Patch 1 Patch Transdermal every 24 hours  losartan 100 milliGRAM(s) Oral every 24 hours  mirtazapine 7.5 milliGRAM(s) Oral every 24 hours  multivitamin 1 Tablet(s) Oral daily  polyethylene glycol 3350 17 Gram(s) Oral daily  senna 2 Tablet(s) Oral at bedtime    MEDICATIONS  (PRN):  acetaminophen     Tablet .. 650 milliGRAM(s) Oral every 6 hours PRN Temp greater or equal to 38C (100.4F), Mild Pain (1 - 3)  aluminum hydroxide/magnesium hydroxide/simethicone Suspension 30 milliLiter(s) Oral every 4 hours PRN Dyspepsia  melatonin 3 milliGRAM(s) Oral at bedtime PRN Insomnia  morphine   Solution 2 milliGRAM(s) Oral every 6 hours PRN Severe Pain (7 - 10)  ondansetron Injectable 4 milliGRAM(s) IV Push every 8 hours PRN Nausea and/or Vomiting         FAMILY HISTORY:      CBC Full  -  ( 11 Mar 2023 07:52 )  WBC Count : 6.25 K/uL  RBC Count : 3.34 M/uL  Hemoglobin : 11.5 g/dL  Hematocrit : 34.7 %  Platelet Count - Automated : 182 K/uL  Mean Cell Volume : 103.9 fl  Mean Cell Hemoglobin : 34.4 pg  Mean Cell Hemoglobin Concentration : 33.1 gm/dL  Auto Neutrophil # : 4.46 K/uL  Auto Lymphocyte # : 1.23 K/uL  Auto Monocyte # : 0.44 K/uL  Auto Eosinophil # : 0.06 K/uL  Auto Basophil # : 0.04 K/uL  Auto Neutrophil % : 71.4 %  Auto Lymphocyte % : 19.7 %  Auto Monocyte % : 7.0 %  Auto Eosinophil % : 1.0 %  Auto Basophil % : 0.6 %      03-11    136  |  103  |  17  ----------------------------<  92  4.4   |  24  |  0.75    Ca    9.4      11 Mar 2023 07:52  Phos  3.3     03-11  Mg     1.8     03-11    TPro  6.2  /  Alb  3.3  /  TBili  0.7  /  DBili  x   /  AST  22  /  ALT  13  /  AlkPhos  65  03-11          Radiology :     < from: Xray Chest 1 View AP/PA (03.10.23 @ 18:52) >  ACC: [id number] EXAM:  XR CHEST AP OR PA 1V   ORDERED BY: SAMANTHA HELLERMAN     PROCEDURE DATE:  03/10/2023          INTERPRETATION:  XR CHEST dated 3/10/2023 6:52 PM    CLINICAL INFORMATION: fall    COMPARISON: None    FINDINGS: Heart size is normal. No focal consolidation pleural effusion.   No pneumothorax. Osseous structures are intact.    IMPRESSION: No acute disease.       < from: CT Head No Cont (03.10.23 @ 19:01) >  ACC: [id number] EXAM:  CT BRAIN   ORDERED BY: SAMANTHA HELLERMAN     PROCEDURE DATE:  03/10/2023          INTERPRETATION:  Brinda SOLIS MD, have reviewed the images and the report   and agree with the findings with the additional modification:    No acute intracranial injury. There is mild enlargement of the sella   measuring approximately 1.2 cm in AP diameter with flattening of the   pituitary gland; findings may represent a partially empty sella.    The patient is status post right canal wall up mastoidectomy.    PROCEDURE: CT head without intravenous contrast    CLINICAL INDICATION: Fall.    TECHNIQUE:  Multiple axial images were obtained and viewed at 5 mm   intervals from the skull base to the vertex. The images were reviewed in   brainand bone windows. Sagittal and coronal reformations are provided.    COMPARISON EXAMINATION: None.    FINDINGS:  VENTRICLES AND SULCI: The ventricles, cisternal spaces, and sulci are   appropriate for patient's age. No hydrocephalus.  INTRA-AXIAL: Bilateral external capsule chronic infarct. No intracranial   mass, acute hemorrhage, or midline shift is present. No acute   transcortical infarction. There are scattered hypodensities throughout   the periventricular white matter, likely the sequela of chronic small   vessel ischemic disease.  EXTRA-AXIAL: No extra-axial fluid collection is present.  VISUALIZED SINUSES: No air-fluid levels are identified.  VISUALIZED MASTOIDS: Well aerated.  CALVARIUM: No fracture.    IMPRESSION:    No acute intracranial hemorrhage or calvarial fracture.      < from: CT Pelvis No Cont (03.10.23 @ 19:11) >  ACC: [id number] EXAM:  CT PELVIS ONLY   ORDERED BY: SAMANTHA HELLERMAN     PROCEDURE DATE:  03/10/2023          INTERPRETATION:  CT PELVIS DATED 3/10/2023 7:11 PM    CLINICAL INFORMATION: Fall. Pelvic fracture.    COMPARISON: None.    CONTRAST/COMPLICATIONS:  IV Contrast: None.  Oral Contrast: None.  Complications: None.    PROCEDURE:  Axial CT images of the pelvis were obtained without administration of IV   or oral contrast, in soft tissue and bone windows. Sagittal and coronal   reformatted images were obtained and reviewed in the bone windows.    FINDINGS:  BLADDER: Within normal limits.  REPRODUCTIVE ORGANS: Uterus and adnexa within normal limits.  LYMPH NODES: No pelvic lymphadenopathy.    VISUALIZED PORTIONS:  ABDOMINAL ORGANS: Within normal limits.  BOWEL: No bowel obstruction. Sigmoid diverticulosis. Normal appendix.  PERITONEUM: No ascites. No intra-abdominal cavity hematoma.  VESSELS: Within normal limits.  ABDOMINAL WALL: Small fat-containing right inguinal hernia. No   significant soft tissue hematoma.  BONES: Comminuted acute nondisplaced bony fracture of the right   acetabulum with intra-articular extension. Mildly displaced buckle   fracture inferior right pubic ramus. Moderate right hip osteoarthritic   changes. Intact bilateral femoral heads and necks and proximal femoral   shafts. No sacroiliac or pubic symphysis diastases.    IMPRESSION:  Acute nondisplaced comminuted intra-articular fracture right acetabulum.   Also mildly displaced fracture inferior right pubic ramus.          Vital Signs Last 24 Hrs  T(C): 36.8 (11 Mar 2023 09:55), Max: 36.8 (11 Mar 2023 09:55)  T(F): 98.3 (11 Mar 2023 09:55), Max: 98.3 (11 Mar 2023 09:55)  HR: 77 (11 Mar 2023 09:55) (70 - 92)  BP: 126/51 (11 Mar 2023 09:55) (118/66 - 159/77)  BP(mean): --  RR: 16 (11 Mar 2023 09:55) (16 - 18)  SpO2: 95% (11 Mar 2023 09:55) (95% - 99%)    Parameters below as of 11 Mar 2023 09:55  Patient On (Oxygen Delivery Method): room air      REVIEW OF SYSTEMS:      CONSTITUTIONAL: No fever, weight loss, or fatigue  EYES: No eye pain, visual disturbances, or discharge  ENMT:  No difficulty hearing, tinnitus, vertigo; No sinus or throat pain  NECK: No pain or stiffness  BREASTS: No pain, masses, or nipple discharge  RESPIRATORY: No cough, wheezing, chills or hemoptysis; No shortness of breath  CARDIOVASCULAR: No chest pain, palpitations, dizziness, or leg swelling  GASTROINTESTINAL: No abdominal or epigastric pain. No nausea, vomiting, or hematemesis; No diarrhea or constipation. No melena or hematochezia.  GENITOURINARY: No dysuria, frequency, hematuria, or incontinence  NEUROLOGICAL: No headaches, memory loss, loss of strength, numbness, or tremors  SKIN: No itching, burning, rashes, or lesions   LYMPH NODES: No enlarged glands  ENDOCRINE: No heat or cold intolerance; No hair loss  MUSCULOSKELETAL:  per hpi   PSYCHIATRIC: No depression, anxiety, mood swings, or difficulty sleeping  HEME/LYMPH: No easy bruising, or bleeding gums  ALLERGY AND IMMUNOLOGIC: No hives or eczema  VASCULAR: no swelling , erythema        Physical Exam :  frail 89 y o wman lying comfortably in semi Bradley's position , awake , alert , no acute complaints     Head : normocephalic , atraumatic    Eyes : PERRLA , EOMI , no nystagmus , sclera anicteric    ENT : nasal discharge , uvula midline , no oropharyngeal erythema / exudate    Neck : supple , negative JVD , negative carotid bruits , no thyromegaly    Chest : CTA bilaterally , neg wheeze / rhonchi / rales / crackles / egophany    Cardiovascular : regular rate and rhythm , neg murmurs / rubs / gallops    Abdomen : soft , non distended , non tender to palpation in all 4 quadrants , normal bowel sounds , negative rebound / guarding     Extremities : WWP , neg cyanosis /clubbing / edema     Musculoskeletal :   uncooperative with hip/pelvis exam      Neurologic Exam :    Alert and oriented to person ,  speech fluent w/o dysarthria , follows commands      Cranial Nerves:     II :                         pupils equal , round and reactive to light , visual fields intact   III/ IV/VI :              extraocular movements intact , neg nystagmus , neg ptosis  V :                        facial sensation intact , V1-3 normal  VII :                      face symmetric , no droop , normal eye closure and smile  VIII :                     hearing intact to finger rub bilaterally  IX and X :             no hoarseness , gag intact , palate/ uvula rise symmetrically  XI :                       SCM / trapezius strength intact bilateral  XII :                      no tongue deviation    Motor Exam:          Right UE:               no focal weakness ,  > 3+/5 throughout                             Left UE:                 no focal weakness ,  > 3+/5 throughout         Right LE:         uncooperative with exam sec to pain with movement      Left LE:              uncooperative with exam sec to pain with movement       Sensation :         intact to light touch x 4 extremities                               DTR :                     biceps/brachioradialis : equal                              patella/ankle : equal                                                              neg Babinski     Gait :  not tested      PM&R Impression : s/p fall with Right pelvic fx/non displaced acetabular fx / toe touch weight bearing R LE     1) deconditioned    2) no focal neurologic deficits    Recommendations / Plan :     1) Physical / Occupational therapy focusing on therapeutic exercises , equipment evaluation , bed mobility/transfer out of bed evaluation , progressive ambulation with assistive devices prn .    2) Current disposition plan recommendation  :     subacute rehab placement

## 2023-03-12 LAB
ANION GAP SERPL CALC-SCNC: 8 MMOL/L — SIGNIFICANT CHANGE UP (ref 5–17)
BASOPHILS # BLD AUTO: 0.04 K/UL — SIGNIFICANT CHANGE UP (ref 0–0.2)
BASOPHILS NFR BLD AUTO: 0.6 % — SIGNIFICANT CHANGE UP (ref 0–2)
BLD GP AB SCN SERPL QL: NEGATIVE — SIGNIFICANT CHANGE UP
BUN SERPL-MCNC: 15 MG/DL — SIGNIFICANT CHANGE UP (ref 7–23)
CALCIUM SERPL-MCNC: 9 MG/DL — SIGNIFICANT CHANGE UP (ref 8.4–10.5)
CHLORIDE SERPL-SCNC: 104 MMOL/L — SIGNIFICANT CHANGE UP (ref 96–108)
CO2 SERPL-SCNC: 22 MMOL/L — SIGNIFICANT CHANGE UP (ref 22–31)
CREAT SERPL-MCNC: 0.78 MG/DL — SIGNIFICANT CHANGE UP (ref 0.5–1.3)
EGFR: 69 ML/MIN/1.73M2 — SIGNIFICANT CHANGE UP
EOSINOPHIL # BLD AUTO: 0.23 K/UL — SIGNIFICANT CHANGE UP (ref 0–0.5)
EOSINOPHIL NFR BLD AUTO: 3.7 % — SIGNIFICANT CHANGE UP (ref 0–6)
GLUCOSE SERPL-MCNC: 88 MG/DL — SIGNIFICANT CHANGE UP (ref 70–99)
HCT VFR BLD CALC: 37.4 % — SIGNIFICANT CHANGE UP (ref 34.5–45)
HGB BLD-MCNC: 12.4 G/DL — SIGNIFICANT CHANGE UP (ref 11.5–15.5)
IMM GRANULOCYTES NFR BLD AUTO: 0.3 % — SIGNIFICANT CHANGE UP (ref 0–0.9)
LYMPHOCYTES # BLD AUTO: 1.36 K/UL — SIGNIFICANT CHANGE UP (ref 1–3.3)
LYMPHOCYTES # BLD AUTO: 21.9 % — SIGNIFICANT CHANGE UP (ref 13–44)
MAGNESIUM SERPL-MCNC: 2 MG/DL — SIGNIFICANT CHANGE UP (ref 1.6–2.6)
MCHC RBC-ENTMCNC: 33.2 GM/DL — SIGNIFICANT CHANGE UP (ref 32–36)
MCHC RBC-ENTMCNC: 34.3 PG — HIGH (ref 27–34)
MCV RBC AUTO: 103.3 FL — HIGH (ref 80–100)
MONOCYTES # BLD AUTO: 0.5 K/UL — SIGNIFICANT CHANGE UP (ref 0–0.9)
MONOCYTES NFR BLD AUTO: 8.1 % — SIGNIFICANT CHANGE UP (ref 2–14)
NEUTROPHILS # BLD AUTO: 4.06 K/UL — SIGNIFICANT CHANGE UP (ref 1.8–7.4)
NEUTROPHILS NFR BLD AUTO: 65.4 % — SIGNIFICANT CHANGE UP (ref 43–77)
NRBC # BLD: 0 /100 WBCS — SIGNIFICANT CHANGE UP (ref 0–0)
PHOSPHATE SERPL-MCNC: 2.8 MG/DL — SIGNIFICANT CHANGE UP (ref 2.5–4.5)
PLATELET # BLD AUTO: 170 K/UL — SIGNIFICANT CHANGE UP (ref 150–400)
POTASSIUM SERPL-MCNC: 4.2 MMOL/L — SIGNIFICANT CHANGE UP (ref 3.5–5.3)
POTASSIUM SERPL-SCNC: 4.2 MMOL/L — SIGNIFICANT CHANGE UP (ref 3.5–5.3)
RBC # BLD: 3.62 M/UL — LOW (ref 3.8–5.2)
RBC # FLD: 13 % — SIGNIFICANT CHANGE UP (ref 10.3–14.5)
RH IG SCN BLD-IMP: POSITIVE — SIGNIFICANT CHANGE UP
SODIUM SERPL-SCNC: 134 MMOL/L — LOW (ref 135–145)
WBC # BLD: 6.21 K/UL — SIGNIFICANT CHANGE UP (ref 3.8–10.5)
WBC # FLD AUTO: 6.21 K/UL — SIGNIFICANT CHANGE UP (ref 3.8–10.5)

## 2023-03-12 RX ORDER — SODIUM CHLORIDE 9 MG/ML
1000 INJECTION INTRAMUSCULAR; INTRAVENOUS; SUBCUTANEOUS
Refills: 0 | Status: DISCONTINUED | OUTPATIENT
Start: 2023-03-12 | End: 2023-03-13

## 2023-03-12 RX ADMIN — LIDOCAINE 1 PATCH: 4 CREAM TOPICAL at 00:09

## 2023-03-12 RX ADMIN — ATORVASTATIN CALCIUM 10 MILLIGRAM(S): 80 TABLET, FILM COATED ORAL at 21:48

## 2023-03-12 RX ADMIN — MORPHINE SULFATE 2 MILLIGRAM(S): 50 CAPSULE, EXTENDED RELEASE ORAL at 14:50

## 2023-03-12 RX ADMIN — SODIUM CHLORIDE 75 MILLILITER(S): 9 INJECTION INTRAMUSCULAR; INTRAVENOUS; SUBCUTANEOUS at 13:35

## 2023-03-12 RX ADMIN — SENNA PLUS 2 TABLET(S): 8.6 TABLET ORAL at 21:48

## 2023-03-12 RX ADMIN — POLYETHYLENE GLYCOL 3350 17 GRAM(S): 17 POWDER, FOR SOLUTION ORAL at 11:37

## 2023-03-12 RX ADMIN — Medication 1 TABLET(S): at 11:37

## 2023-03-12 RX ADMIN — MIRTAZAPINE 7.5 MILLIGRAM(S): 45 TABLET, ORALLY DISINTEGRATING ORAL at 21:48

## 2023-03-12 RX ADMIN — MORPHINE SULFATE 2 MILLIGRAM(S): 50 CAPSULE, EXTENDED RELEASE ORAL at 14:23

## 2023-03-12 RX ADMIN — LOSARTAN POTASSIUM 100 MILLIGRAM(S): 100 TABLET, FILM COATED ORAL at 06:34

## 2023-03-12 RX ADMIN — ENOXAPARIN SODIUM 30 MILLIGRAM(S): 100 INJECTION SUBCUTANEOUS at 06:34

## 2023-03-12 RX ADMIN — LIDOCAINE 1 PATCH: 4 CREAM TOPICAL at 11:41

## 2023-03-12 NOTE — DIETITIAN INITIAL EVALUATION ADULT - PERTINENT MEDS FT
MEDICATIONS  (STANDING):  atorvastatin 10 milliGRAM(s) Oral at bedtime  enoxaparin Injectable 30 milliGRAM(s) SubCutaneous every 24 hours  lidocaine   4% Patch 1 Patch Transdermal every 24 hours  losartan 100 milliGRAM(s) Oral every 24 hours  mirtazapine 7.5 milliGRAM(s) Oral every 24 hours  multivitamin 1 Tablet(s) Oral daily  polyethylene glycol 3350 17 Gram(s) Oral daily  senna 2 Tablet(s) Oral at bedtime  sodium chloride 0.9%. 1000 milliLiter(s) (75 mL/Hr) IV Continuous <Continuous>    MEDICATIONS  (PRN):  acetaminophen     Tablet .. 650 milliGRAM(s) Oral every 6 hours PRN Temp greater or equal to 38C (100.4F), Mild Pain (1 - 3)  aluminum hydroxide/magnesium hydroxide/simethicone Suspension 30 milliLiter(s) Oral every 4 hours PRN Dyspepsia  melatonin 3 milliGRAM(s) Oral at bedtime PRN Insomnia  morphine   Solution 2 milliGRAM(s) Oral every 6 hours PRN Severe Pain (7 - 10)  ondansetron Injectable 4 milliGRAM(s) IV Push every 8 hours PRN Nausea and/or Vomiting

## 2023-03-12 NOTE — DIETITIAN INITIAL EVALUATION ADULT - PERTINENT LABORATORY DATA
03-12    134<L>  |  104  |  15  ----------------------------<  88  4.2   |  22  |  0.78    Ca    9.0      12 Mar 2023 05:30  Phos  2.8     03-12  Mg     2.0     03-12    TPro  6.2  /  Alb  3.3  /  TBili  0.7  /  DBili  x   /  AST  22  /  ALT  13  /  AlkPhos  65  03-11  A1C with Estimated Average Glucose Result: 5.4 % (03-11-23 @ 07:52)

## 2023-03-12 NOTE — DIETITIAN INITIAL EVALUATION ADULT - OTHER CALCULATIONS
ABW used for calculations as pt between % of IBW. (85%). Needs adjusted for advanced age, PU wound healing, and healthy weight gain

## 2023-03-12 NOTE — DIETITIAN INITIAL EVALUATION ADULT - NSFNSPHYEXAMSKINFT_GEN_A_CORE
Pressure Injury 1: Right:,elbow, Stage I  Pressure Injury 2: Right:,ear, Suspected deep tissue injury  Pressure Injury 3: none, none  Pressure Injury 4: none, none  Pressure Injury 5: none, none  Pressure Injury 6: none, none  Pressure Injury 7: none, none  Pressure Injury 8: none, none  Pressure Injury 9: none, none  Pressure Injury 10: none, none  Pressure Injury 11: none, none, Pressure Injury 1: Right:,elbow, Stage I  Pressure Injury 2: Right:,ear, Suspected deep tissue injury  Pressure Injury 3: none, none  Pressure Injury 4: none, none  Pressure Injury 5: none, none  Pressure Injury 6: none, none  Pressure Injury 7: none, none  Pressure Injury 8: none, none  Pressure Injury 9: none, none  Pressure Injury 10: none, none  Pressure Injury 11: none, none

## 2023-03-12 NOTE — DIETITIAN INITIAL EVALUATION ADULT - OTHER INFO
89 y o F w/ PMHx of HTN, HLD, dementia, s/p R knee replacement presents to [facility] ED after an unwitnessed fall at home. Pt now planned for TRACE when medical feasible.     On assessment, pt resting in bed. Noted to be pleasantly confused unable to participate in assessment. HHA was not at bedside, so hx obtained via EMR. Currently on DASH/ TLC diet tolerating PO well. Tray was not at bedside so unable to quantify %PO, but per team, pt had a fair appetite. No n/v/d/c. No abd distention or discomfort. Skin Colin score 12. PU: Stg I to right elbow, and suspected DTI of right ear. No pain noted at this time. Unable to assess UBW nor appetite PTA. NKFA. NFPE revealed wasting, but likely age-related sarcopenia as pt is 89 y/o. Formal education deferred 2/2 confusion, RD focused on proving encouragement of PO intake as tolerated. To aid with overlal PO intake and PU wound healing, would recommend addition of Ensure Enlive BID (700 kcal, 40g protein, 360 mL free H2O)- please see remaining recs below. RD to follow.

## 2023-03-13 DIAGNOSIS — K59.00 CONSTIPATION, UNSPECIFIED: ICD-10-CM

## 2023-03-13 LAB — SARS-COV-2 RNA SPEC QL NAA+PROBE: SIGNIFICANT CHANGE UP

## 2023-03-13 RX ADMIN — LIDOCAINE 1 PATCH: 4 CREAM TOPICAL at 11:47

## 2023-03-13 RX ADMIN — POLYETHYLENE GLYCOL 3350 17 GRAM(S): 17 POWDER, FOR SOLUTION ORAL at 11:09

## 2023-03-13 RX ADMIN — SENNA PLUS 2 TABLET(S): 8.6 TABLET ORAL at 22:55

## 2023-03-13 RX ADMIN — ENOXAPARIN SODIUM 30 MILLIGRAM(S): 100 INJECTION SUBCUTANEOUS at 06:23

## 2023-03-13 RX ADMIN — LOSARTAN POTASSIUM 100 MILLIGRAM(S): 100 TABLET, FILM COATED ORAL at 06:23

## 2023-03-13 RX ADMIN — Medication 1 TABLET(S): at 11:09

## 2023-03-13 RX ADMIN — LIDOCAINE 1 PATCH: 4 CREAM TOPICAL at 06:50

## 2023-03-13 RX ADMIN — ATORVASTATIN CALCIUM 10 MILLIGRAM(S): 80 TABLET, FILM COATED ORAL at 22:55

## 2023-03-13 RX ADMIN — MIRTAZAPINE 7.5 MILLIGRAM(S): 45 TABLET, ORALLY DISINTEGRATING ORAL at 22:54

## 2023-03-13 RX ADMIN — LIDOCAINE 1 PATCH: 4 CREAM TOPICAL at 00:13

## 2023-03-13 NOTE — PROGRESS NOTE ADULT - ASSESSMENT
per Internal Medicine    89 y o F w/ PMHx of HTN, HLD, dementia, s/p R knee replacement presents to [facility] ED after an unwitnessed fall at home.      Problem/Plan - 1:  ·  Problem: Fall.   ·  Plan: Patient presented with an unwitnessed fall today. Denied MATTY, head trauma, cardiac or seizure history, and fecal/urinary incontinence. Patient ambulates with cane at baseline. CTH - no acute intracranial pathology. CT pelvis - Acute nondisplaced comminuted intra-articular fracture right acetabulum. Also mildly displaced fracture inferior right pubic ramus. Lactate 0.9. CK 68  - Ortho consulted, appreciate recs   - PT recommending TRACE  - Obtain U/A  - Obtain orthostatics  - Pain management: Tylenol 650mg q6 PO PRN, Morphine 2mg PO q8 PRN, lidocaine patch  - Fall precautions  - per ortho no surgical intervention at this time  - Toe-Touch WB RLE  - F/u outpatient with ortho.    Problem/Plan - 2:  ·  Problem: Leukocytosis.   ·  Plan: Presented with WBC 12K. No additional SIRS criteria. Likely reactive 2/2 fall  - Trend WBC  - Monitor off ABx.    Problem/Plan - 3:  ·  Problem: HTN (hypertension).   ·  Plan: Known history of HTN. On home Telmisartan 80mg qd  - c/w therapeutic interchange losartan 100mg qdaily.    Problem/Plan - 4:  ·  Problem: Dementia.   ·  Plan: Patient with known dementia. Baseline AAOx1. Per chart review, legal  makes medical decisions. On home mirtazapine 7.5mg qd  - c/w home med mirtazapine 7.5mg qd  - Obtain collateral from  [employer]: [phone].    Problem/Plan - 5:  ·  Problem: Hyperlipemia.   ·  Plan: Hx of HLD. On home lipitor 10mg   - c/w home lipitor 10mg.    Problem/Plan - 6:  ·  Problem: Need for prophylactic measure.   ·  Plan: Plan:  F: None  E: replete K<4, Mg<2  N: regular  VTE Prophylaxis: Lovenox  GI: home pepcid  C: Full Code per previous chart review from   D: RMF.

## 2023-03-13 NOTE — PROGRESS NOTE ADULT - PROBLEM SELECTOR PLAN 6
Plan:  F: None  E: replete K<4, Mg<2  N: regular  VTE Prophylaxis: Lovenox  GI: home pepcid  C: Full Code per previous chart review from   D: CLARA - c/w miralax 17g PO qd  - c/w senna 2 tabs PO qhs

## 2023-03-13 NOTE — PROGRESS NOTE ADULT - PROBLEM SELECTOR PLAN 1
Patient presented with an unwitnessed fall today. Denied MATTY, head trauma, cardiac or seizure history, and fecal/urinary incontinence. Patient ambulates with cane at baseline. CTH - no acute intracranial pathology. CT pelvis - Acute nondisplaced comminuted intra-articular fracture right acetabulum. Also mildly displaced fracture inferior right pubic ramus. Lactate 0.9. CK 68  - Ortho consulted, appreciate recs   - PT recommending TRACE  - Obtain U/A  - Obtain orthostatics  - Pain management: Tylenol 650mg q6 PO PRN, Morphine 2mg PO q8 PRN, lidocaine patch  - Fall precautions  - per ortho no surgical intervention at this time  - Toe-Touch WB RLE  - F/u outpatient with ortho Patient presented with an unwitnessed fall today. Denied MATTY, head trauma, cardiac or seizure history, and fecal/urinary incontinence. Patient ambulates with cane at baseline. CTH - no acute intracranial pathology. CT pelvis - Acute nondisplaced comminuted intra-articular fracture right acetabulum. Also mildly displaced fracture inferior right pubic ramus. Lactate 0.9. CK 68  - Ortho consulted, appreciate recs   - PT recommending TRACE  - OT consulted  - UA +trace ketones otherwise negative  - unable to obtain orthostatics, pt unable to stand w/ toe touch weight bearing  - c/w Tylenol 650mg q6 PO PRN for mild pain  - c/w Morphine 2mg PO q8 PRN for severe pain  - c/w lidocaine 4% patch qd  - Fall precautions  - per ortho no surgical intervention at this time  - Toe-Touch WB RLE  - F/u outpatient with ortho

## 2023-03-13 NOTE — PROGRESS NOTE ADULT - PROBLEM SELECTOR PLAN 7
Plan:  F: None  E: replete K<4, Mg<2  N: regular  VTE Prophylaxis: Lovenox 30 mg SQ qd  Other: MV tab PO qd, melatonin 3mg PO qhs PRN  C: Full Code  D: CLARA

## 2023-03-13 NOTE — PROGRESS NOTE ADULT - PROBLEM SELECTOR PLAN 2
Presented with WBC 12K. No additional SIRS criteria. Likely reactive 2/2 fall  - Trend WBC  - Monitor off ABx Presented with WBC 12K. No additional SIRS criteria. Likely reactive 2/2 fall  - RESOLVED

## 2023-03-13 NOTE — PROGRESS NOTE ADULT - PROBLEM SELECTOR PLAN 3
Known history of HTN. On home Telmisartan 80mg qd  - c/w therapeutic interchange losartan 100mg qdaily. Known history of HTN. On home Telmisartan 80mg qd  - c/w therapeutic interchange losartan 100mg PO qd

## 2023-03-13 NOTE — PROGRESS NOTE ADULT - PROBLEM SELECTOR PLAN 4
Patient with known dementia. Baseline AAOx1. Per chart review, legal  makes medical decisions. On home mirtazapine 7.5mg qd  - c/w home med mirtazapine 7.5mg qd  - Obtain collateral from  [employer] [facility]: [phone] Patient with known dementia. Baseline AAOx1. Per chart review, legal  makes medical decisions. On home mirtazapine 7.5mg qd  - c/w home med mirtazapine 7.5 mg PO qd

## 2023-03-13 NOTE — PROGRESS NOTE ADULT - SUBJECTIVE AND OBJECTIVE BOX
Physical Medicine and Rehabilitation Progress Note :       Patient is a 89y old  Female who presents with a chief complaint of PELVIC FRACTURE     (12 Mar 2023 15:27)      HPI:  HPI:  89F w/ PMHx of HTN, HLD, dementia, s/p R knee replacement presents to [facility] ED after an unwitnessed fall at home. Collateral obtained from HHA at bedside, patient was sitting up in bed and eating food. The HHA walked to another room and heard a loud noise soon after. Upon arrival, patient was found sitting on the floor. Denied seizures, tongue injury, urinary or fecal incontinence during this fall. Unknown if patient LOC or had head trauma. Patient does not have known cardiac history. Patient is no on ASA or anticoagulation. Patient normally ambulates with a cane. ROS limited by underlying dementia. Patient is currently endorsing hip pain, however denies headaches, acute chest pain, SOB, palpitations, abdominal pain, genitourinary symptoms.    ED Course:  Vitals: 98 F, HR 82, /77, RR 18(99%) on Room air  Labs: WBC 12.3, Neutrophils 82%, BUN 16, Cr 0.84, Lactate 0.9, Ck 68  Imaging: CTH - no acute intracranial pathology. CT pelvis - Acute nondisplaced comminuted intra-articular fracture right acetabulum.  EKG: NSR w/ 1 AVB, normal axis, Q waves V2  Consults: Ortho  Interventions: Tylenol 650mg PO x1   (10 Mar 2023 22:02)                            12.4   6.21  )-----------( 170      ( 12 Mar 2023 05:30 )             37.4       03-12    134<L>  |  104  |  15  ----------------------------<  88  4.2   |  22  |  0.78    Ca    9.0      12 Mar 2023 05:30  Phos  2.8     03-12  Mg     2.0     03-12      Vital Signs Last 24 Hrs  T(C): 37 (13 Mar 2023 10:00), Max: 37 (13 Mar 2023 10:00)  T(F): 98.6 (13 Mar 2023 10:00), Max: 98.6 (13 Mar 2023 10:00)  HR: 86 (13 Mar 2023 10:00) (81 - 96)  BP: 128/72 (13 Mar 2023 10:00) (123/51 - 135/71)  BP(mean): --  RR: 18 (13 Mar 2023 10:00) (18 - 18)  SpO2: 95% (13 Mar 2023 10:00) (94% - 97%)    Parameters below as of 13 Mar 2023 10:00  Patient On (Oxygen Delivery Method): room air        MEDICATIONS  (STANDING):  atorvastatin 10 milliGRAM(s) Oral at bedtime  enoxaparin Injectable 30 milliGRAM(s) SubCutaneous every 24 hours  lidocaine   4% Patch 1 Patch Transdermal every 24 hours  losartan 100 milliGRAM(s) Oral every 24 hours  mirtazapine 7.5 milliGRAM(s) Oral every 24 hours  multivitamin 1 Tablet(s) Oral daily  polyethylene glycol 3350 17 Gram(s) Oral daily  senna 2 Tablet(s) Oral at bedtime  sodium chloride 0.9%. 1000 milliLiter(s) (75 mL/Hr) IV Continuous <Continuous>    MEDICATIONS  (PRN):  acetaminophen     Tablet .. 650 milliGRAM(s) Oral every 6 hours PRN Temp greater or equal to 38C (100.4F), Mild Pain (1 - 3)  aluminum hydroxide/magnesium hydroxide/simethicone Suspension 30 milliLiter(s) Oral every 4 hours PRN Dyspepsia  melatonin 3 milliGRAM(s) Oral at bedtime PRN Insomnia  morphine   Solution 2 milliGRAM(s) Oral every 6 hours PRN Severe Pain (7 - 10)  ondansetron Injectable 4 milliGRAM(s) IV Push every 8 hours PRN Nausea and/or Vomiting        Initial Physical Therapy Functional Status Assessment :       Previous Level of Function:     · Ambulation Skills	needs device and assist  · Transfer Skills	needs device and assist  · ADL Skills	needs device and assist  · Work/Leisure Activity	needs device and assist  · Additional Comments	Pt lives alone in an apartment with 24/7 [facility] services and ambulates with SC at baseline.    Cognitive Status Examination:   · Orientation	person  · Level of Consciousness	confused  · Follows Commands and Answers Questions	able to follow single-step instructions; 75% of the time  · Personal Safety and Judgment	impaired    Manual Muscle Testing:   · Manual Muscle Testing Results	grossly assessed due to  at least 3/5 BL UE & LE based on antigravity mobility.    Bed Mobility: Rolling/Turning:     · Level of [place]	moderate assist (50% patients effort)  · Physical Assist/Nonphysical Assist	1 person assist    Bed Mobility: Scooting/Bridging:     · Level of [place]	moderate assist (50% patients effort)  · Physical Assist/Nonphysical Assist	1 person assist    Bed Mobility: Sit to Supine:     · Level of [place]	moderate assist (50% patients effort)  · Physical Assist/Nonphysical Assist	1 person assist    Bed Mobility: Supine to Sit:     · Level of [place]	moderate assist (50% patients effort)  · Physical Assist/Nonphysical Assist	1 person assist    Bed Mobility Analysis:     · Bed Mobility Limitations	decreased ability to use legs for bridging/pushing  · Impairments Contributing to Impaired Bed Mobility	decreased strength    Transfer: Sit to Stand:     · Level of [place]	moderate assist (50% patients effort)  · Physical Assist/Nonphysical Assist	verbal cues; nonverbal cues (demo/gestures); 1 person assist  · Weight-Bearing Restrictions	toe touch weight-bearing  · Assistive Device	rolling walker    Transfer: Stand to Sit:     · Level of [place]	moderate assist (50% patients effort)  · Physical Assist/Nonphysical Assist	1 person assist  · Weight-Bearing Restrictions	toe touch weight-bearing  · Assistive Device	rolling walker    Sit/Stand Transfer Safety Analysis:     · Transfer Safety Concerns Noted	decreased weight-shifting ability  · Impairments Contributing to Impaired Transfers	impaired balance; impaired postural control; decreased strength    Balance Skills Assessment:     · Sitting Balance: Static	good balance  · Sitting Balance: Dynamic	good balance  · Sit-to-Stand Balance	fair balance  · Standing Balance: Static	fair minus    Treatment Location:   · Comments	Supine Therex: Ankle pump, Heel slides, Quad sets, Glute sets .    Clinical Impressions:   · Criteria for Skilled Therapeutic Interventions	impairments found; rehab potential; anticipated equipment needs at discharge; functional limitations in following categories; therapy frequency; predicted duration of therapy intervention; anticipated discharge recommendation  · Impairments Found (describe specific impairments)	aerobic capacity/endurance; arousal, attention, and cognition; cognitive impairment; gait, locomotion, and balance; muscle strength; poor safety awareness  · Functional Limitations in Following Categories (describe specific limitations)	self-care; home management; community/leisure      PM&R Impression : as above    Current Disposition Plan Recommendations :    subacute rehab placement

## 2023-03-13 NOTE — PROGRESS NOTE ADULT - PROBLEM SELECTOR PLAN 5
Hx of HLD. On home lipitor 10mg   - c/w home lipitor 10mg Hx of HLD. On home lipitor 10mg   - c/w home lipitor 10mg PO qhs

## 2023-03-13 NOTE — PROGRESS NOTE ADULT - SUBJECTIVE AND OBJECTIVE BOX
**INCOMPLETE NOTE    OVERNIGHT EVENTS:    SUBJECTIVE:  Patient seen and examined at bedside.    Vital Signs Last 12 Hrs  T(F): 98.5 (23 @ 05:29), Max: 98.5 (23 @ 20:35)  HR: 96 (23 @ 05:29) (87 - 96)  BP: 135/71 (23 @ 05:29) (123/51 - 135/71)  BP(mean): --  RR: 18 (23 @ 05:29) (18 - 18)  SpO2: 97% (23 @ 05:29) (94% - 97%)  I&O's Summary    12 Mar 2023 07:01  -  13 Mar 2023 07:00  --------------------------------------------------------  IN: 1125 mL / OUT: 0 mL / NET: 1125 mL        PHYSICAL EXAM:  Constitutional: NAD, comfortable in bed.  HEENT: NC/AT, PERRLA, EOMI, no conjunctival pallor or scleral icterus, MMM  Neck: Supple, no JVD  Respiratory: CTA B/L. No w/r/r.   Cardiovascular: RRR, normal S1 and S2, no m/r/g.   Gastrointestinal: +BS, soft NTND, no guarding or rebound tenderness, no palpable masses   Extremities: wwp; no cyanosis, clubbing or edema.   Vascular: Pulses equal and strong throughout.   Neurological: AAOx3, no CN deficits, strength and sensation intact throughout.   Skin: No gross skin abnormalities or rashes        LABS:                        12.4   6.21  )-----------( 170      ( 12 Mar 2023 05:30 )             37.4     03-12    134<L>  |  104  |  15  ----------------------------<  88  4.2   |  22  |  0.78    Ca    9.0      12 Mar 2023 05:30  Phos  2.8     03-12  Mg     2.0     03-12    TPro  6.2  /  Alb  3.3  /  TBili  0.7  /  DBili  x   /  AST  22  /  ALT  13  /  AlkPhos  65  03-11      Urinalysis Basic - ( 11 Mar 2023 18:58 )    Color: Yellow / Appearance: Clear / S.025 / pH: x  Gluc: x / Ketone: Trace mg/dL  / Bili: Negative / Urobili: 1.0 E.U./dL   Blood: x / Protein: NEGATIVE mg/dL / Nitrite: NEGATIVE   Leuk Esterase: NEGATIVE / RBC: x / WBC x   Sq Epi: x / Non Sq Epi: x / Bacteria: x          RADIOLOGY & ADDITIONAL TESTS:    MEDICATIONS  (STANDING):  atorvastatin 10 milliGRAM(s) Oral at bedtime  enoxaparin Injectable 30 milliGRAM(s) SubCutaneous every 24 hours  lidocaine   4% Patch 1 Patch Transdermal every 24 hours  losartan 100 milliGRAM(s) Oral every 24 hours  mirtazapine 7.5 milliGRAM(s) Oral every 24 hours  multivitamin 1 Tablet(s) Oral daily  polyethylene glycol 3350 17 Gram(s) Oral daily  senna 2 Tablet(s) Oral at bedtime  sodium chloride 0.9%. 1000 milliLiter(s) (75 mL/Hr) IV Continuous <Continuous>    MEDICATIONS  (PRN):  acetaminophen     Tablet .. 650 milliGRAM(s) Oral every 6 hours PRN Temp greater or equal to 38C (100.4F), Mild Pain (1 - 3)  aluminum hydroxide/magnesium hydroxide/simethicone Suspension 30 milliLiter(s) Oral every 4 hours PRN Dyspepsia  melatonin 3 milliGRAM(s) Oral at bedtime PRN Insomnia  morphine   Solution 2 milliGRAM(s) Oral every 6 hours PRN Severe Pain (7 - 10)  ondansetron Injectable 4 milliGRAM(s) IV Push every 8 hours PRN Nausea and/or Vomiting   OVERNIGHT EVENTS:    SUBJECTIVE:  Patient seen and examined at bedside.    Vital Signs Last 12 Hrs  T(F): 98.5 (23 @ 05:29), Max: 98.5 (23 @ 20:35)  HR: 96 (23 @ 05:29) (87 - 96)  BP: 135/71 (23 @ 05:29) (123/51 - 135/71)  BP(mean): --  RR: 18 (23 @ 05:29) (18 - 18)  SpO2: 97% (23 @ 05:29) (94% - 97%)  I&O's Summary    12 Mar 2023 07:01  -  13 Mar 2023 07:00  --------------------------------------------------------  IN: 1125 mL / OUT: 0 mL / NET: 1125 mL        PHYSICAL EXAM:  Constitutional: NAD, comfortable in bed.  HEENT: NC/AT, EOMI, no conjunctival pallor or scleral icterus, MMM  Neck: Supple  Respiratory: CTA B/L. No w/r/r.   Cardiovascular: RRR, normal S1 and S2, no m/r/g.   Gastrointestinal: soft NTND, no guarding or rebound tenderness, no palpable masses   Extremities: wwp; no cyanosis, clubbing or edema.   Neurological: AAOx1 (self)        LABS:                        12.4   6.21  )-----------( 170      ( 12 Mar 2023 05:30 )             37.4     03-12    134<L>  |  104  |  15  ----------------------------<  88  4.2   |  22  |  0.78    Ca    9.0      12 Mar 2023 05:30  Phos  2.8     03-12  Mg     2.0     03-12    TPro  6.2  /  Alb  3.3  /  TBili  0.7  /  DBili  x   /  AST  22  /  ALT  13  /  AlkPhos  65  03-11      Urinalysis Basic - ( 11 Mar 2023 18:58 )    Color: Yellow / Appearance: Clear / S.025 / pH: x  Gluc: x / Ketone: Trace mg/dL  / Bili: Negative / Urobili: 1.0 E.U./dL   Blood: x / Protein: NEGATIVE mg/dL / Nitrite: NEGATIVE   Leuk Esterase: NEGATIVE / RBC: x / WBC x   Sq Epi: x / Non Sq Epi: x / Bacteria: x          RADIOLOGY & ADDITIONAL TESTS:    MEDICATIONS  (STANDING):  atorvastatin 10 milliGRAM(s) Oral at bedtime  enoxaparin Injectable 30 milliGRAM(s) SubCutaneous every 24 hours  lidocaine   4% Patch 1 Patch Transdermal every 24 hours  losartan 100 milliGRAM(s) Oral every 24 hours  mirtazapine 7.5 milliGRAM(s) Oral every 24 hours  multivitamin 1 Tablet(s) Oral daily  polyethylene glycol 3350 17 Gram(s) Oral daily  senna 2 Tablet(s) Oral at bedtime  sodium chloride 0.9%. 1000 milliLiter(s) (75 mL/Hr) IV Continuous <Continuous>    MEDICATIONS  (PRN):  acetaminophen     Tablet .. 650 milliGRAM(s) Oral every 6 hours PRN Temp greater or equal to 38C (100.4F), Mild Pain (1 - 3)  aluminum hydroxide/magnesium hydroxide/simethicone Suspension 30 milliLiter(s) Oral every 4 hours PRN Dyspepsia  melatonin 3 milliGRAM(s) Oral at bedtime PRN Insomnia  morphine   Solution 2 milliGRAM(s) Oral every 6 hours PRN Severe Pain (7 - 10)  ondansetron Injectable 4 milliGRAM(s) IV Push every 8 hours PRN Nausea and/or Vomiting

## 2023-03-14 ENCOUNTER — TRANSCRIPTION ENCOUNTER (OUTPATIENT)
Age: 88
End: 2023-03-14

## 2023-03-14 DIAGNOSIS — R33.9 RETENTION OF URINE, UNSPECIFIED: ICD-10-CM

## 2023-03-14 RX ORDER — SENNA PLUS 8.6 MG/1
2 TABLET ORAL AT BEDTIME
Refills: 0 | Status: DISCONTINUED | OUTPATIENT
Start: 2023-03-14 | End: 2023-03-17

## 2023-03-14 RX ORDER — LIDOCAINE 4 G/100G
1 CREAM TOPICAL
Qty: 0 | Refills: 0 | DISCHARGE
Start: 2023-03-14

## 2023-03-14 RX ORDER — POLYETHYLENE GLYCOL 3350 17 G/17G
17 POWDER, FOR SOLUTION ORAL DAILY
Refills: 0 | Status: DISCONTINUED | OUTPATIENT
Start: 2023-03-14 | End: 2023-03-17

## 2023-03-14 RX ORDER — POLYETHYLENE GLYCOL 3350 17 G/17G
17 POWDER, FOR SOLUTION ORAL
Qty: 0 | Refills: 0 | DISCHARGE
Start: 2023-03-14

## 2023-03-14 RX ORDER — ENOXAPARIN SODIUM 100 MG/ML
40 INJECTION SUBCUTANEOUS
Qty: 0 | Refills: 0 | DISCHARGE
Start: 2023-03-14

## 2023-03-14 RX ORDER — SENNA PLUS 8.6 MG/1
2 TABLET ORAL
Qty: 0 | Refills: 0 | DISCHARGE
Start: 2023-03-14

## 2023-03-14 RX ADMIN — LOSARTAN POTASSIUM 100 MILLIGRAM(S): 100 TABLET, FILM COATED ORAL at 06:14

## 2023-03-14 RX ADMIN — LIDOCAINE 1 PATCH: 4 CREAM TOPICAL at 00:59

## 2023-03-14 RX ADMIN — LIDOCAINE 1 PATCH: 4 CREAM TOPICAL at 06:22

## 2023-03-14 RX ADMIN — ENOXAPARIN SODIUM 30 MILLIGRAM(S): 100 INJECTION SUBCUTANEOUS at 06:14

## 2023-03-14 RX ADMIN — ATORVASTATIN CALCIUM 10 MILLIGRAM(S): 80 TABLET, FILM COATED ORAL at 21:30

## 2023-03-14 RX ADMIN — MIRTAZAPINE 7.5 MILLIGRAM(S): 45 TABLET, ORALLY DISINTEGRATING ORAL at 21:30

## 2023-03-14 RX ADMIN — Medication 1 TABLET(S): at 11:06

## 2023-03-14 RX ADMIN — LIDOCAINE 1 PATCH: 4 CREAM TOPICAL at 10:37

## 2023-03-14 NOTE — PROGRESS NOTE ADULT - PROBLEM SELECTOR PLAN 3
Known history of HTN. On home Telmisartan 80mg qd  - c/w therapeutic interchange losartan 100mg PO qd

## 2023-03-14 NOTE — OCCUPATIONAL THERAPY INITIAL EVALUATION ADULT - GENERAL OBSERVATIONS, REHAB EVAL
HALEY nance pt. for OOB. Pt. received semi-supine in bed,+heplock,+primafit in NAD agreeable to therapy session

## 2023-03-14 NOTE — PROGRESS NOTE ADULT - ASSESSMENT
89F w/ PMHx of HTN, HLD, dementia, s/p R knee replacement presents to [facility] ED after an unwitnessed fall at home.

## 2023-03-14 NOTE — PROGRESS NOTE ADULT - PROBLEM SELECTOR PLAN 8
Plan:  F: None  E: replete K<4, Mg<2  N: regular  VTE Prophylaxis: Lovenox 30 mg SQ qd  Other: MV tab PO qd, melatonin 3mg PO qhs PRN  C: Full Code  D: CLARA Plan:  F: None  E: replete K<4, Mg<2  N: regular  VTE Prophylaxis: Lovenox 30 mg SQ qd  Other: MV tab PO qd, melatonin 3mg PO qhs PRN  C: FULL CODE  D: CLARA

## 2023-03-14 NOTE — PROGRESS NOTE ADULT - PROBLEM SELECTOR PROBLEM 4
Interval History: NAEON; Cardiology and Neurology consulted.    Review of Systems   Constitutional: Positive for activity change. Negative for appetite change and diaphoresis.   HENT: Negative.    Eyes: Negative.    Respiratory: Negative for cough and shortness of breath.    Cardiovascular: Positive for chest pain. Negative for palpitations and leg swelling.   Gastrointestinal: Negative.    Endocrine: Negative.    Genitourinary: Negative.    Musculoskeletal: Negative.    Skin: Negative.    Allergic/Immunologic: Negative.    Neurological: Positive for numbness.   Hematological: Negative.    Psychiatric/Behavioral: Negative.      Objective:     Vital Signs (Most Recent):  Temp: 97.5 °F (36.4 °C) (01/10/22 0718)  Pulse: 76 (01/10/22 0725)  Resp: 20 (01/10/22 0725)  BP: 126/62 (01/10/22 0718)  SpO2: 97 % (01/10/22 0725) Vital Signs (24h Range):  Temp:  [96.3 °F (35.7 °C)-97.8 °F (36.6 °C)] 97.5 °F (36.4 °C)  Pulse:  [70-89] 76  Resp:  [14-20] 20  SpO2:  [95 %-99 %] 97 %  BP: (111-145)/(55-85) 126/62     Weight: 127 kg (280 lb)  Body mass index is 39.05 kg/m².  No intake or output data in the 24 hours ending 01/10/22 0743   Physical Exam  Vitals and nursing note reviewed.   Constitutional:       Appearance: He is obese. He is ill-appearing.   HENT:      Head: Normocephalic and atraumatic.      Right Ear: External ear normal.      Left Ear: External ear normal.      Nose: Nose normal.      Mouth/Throat:      Mouth: Mucous membranes are moist.      Pharynx: Oropharynx is clear.   Eyes:      Extraocular Movements: Extraocular movements intact.      Conjunctiva/sclera: Conjunctivae normal.   Neck:      Vascular: No carotid bruit.   Cardiovascular:      Rate and Rhythm: Normal rate and regular rhythm.      Pulses: Normal pulses.      Heart sounds: Normal heart sounds.   Pulmonary:      Effort: Pulmonary effort is normal. No respiratory distress.      Breath sounds: Normal breath sounds.   Abdominal:      General: Bowel sounds  are normal. There is no distension.      Tenderness: There is no abdominal tenderness.   Musculoskeletal:         General: Normal range of motion.      Cervical back: Normal range of motion and neck supple.      Right lower leg: No edema.      Left lower leg: No edema.   Skin:     General: Skin is warm and dry.      Comments: Tattoos. Mid-sternal scar.   Neurological:      General: No focal deficit present.      Mental Status: He is oriented to person, place, and time. Mental status is at baseline.   Psychiatric:         Mood and Affect: Mood normal.         Behavior: Behavior normal.         Significant Labs: All pertinent labs within the past 24 hours have been reviewed.    Significant Imaging: I have reviewed all pertinent imaging results/findings within the past 24 hours.   Dementia

## 2023-03-14 NOTE — OCCUPATIONAL THERAPY INITIAL EVALUATION ADULT - ORIENTATION, REHAB EVAL
Call Center TCM Note    Flowsheet Row Responses   [facility] patient discharged from? Non-   Does the patient have one of the following disease processes/diagnoses(primary or secondary)? Non-[facility] Discharge   TCM attempt successful? Yes   Call start time 1426   Call end time 1429   General alerts for this patient Dialysis M [facility]   Discharge diagnosis Unavailable    Person spoke with today (if not patient) and relationship Patient   Meds reviewed with patient/caregiver? Yes   Is the patient having any side effects they believe may be caused by any medication additions or changes? No   Does the patient have all medications ordered at discharge? N/A   Is the patient taking all medications as directed (includes completed medication regime)? Yes   Does the patient have a primary care provider?  Yes   Does the patient have an appointment with their PCP within 7 days of discharge? Yes   Has the patient kept scheduled appointments due by today? N/A   What is the Home health agency?  [facility]    Has home health visited the patient within 72 hours of discharge? Call prior to 72 hours   Psychosocial issues? No   Did the patient receive a copy of their discharge instructions? Yes   Nursing interventions Reviewed instructions with patient   What is the patient's perception of their health status since discharge? Improving   Is the patient/caregiver able to teach back signs and symptoms related to disease process for when to call PCP? Yes   Is the patient/caregiver able to teach back signs and symptoms related to disease process for when to call 911? Yes   Is the patient/caregiver able to teach back the hierarchy of who to call/visit for symptoms/problems? PCP, Specialist, Home health nurse, Urgent Care, ED, 911 Yes   If the patient is a current smoker, are they able to teach back resources for cessation? Not a smoker   TCM call completed? Yes   Wrap up additional comments Pt states she does not know why she was admitted into  hospital,  pt shares she is feeling fine. Pt verfied PCP fu appt on 3/14/22. No questions/concerns.          Danita Morris RN    3/11/2022, 14:30 EST       person/place

## 2023-03-14 NOTE — OCCUPATIONAL THERAPY INITIAL EVALUATION ADULT - DIAGNOSIS, OT EVAL
Pt. admitted to [facility] for further workup s/p mechanical fall in home. Pt. dx with R pubic rami and R acetabulum fx. Upon assessment pt. presents with generalized muscle weakness, decreased activity tolerance, impairments in balance and postural control as well as increased pain and decreased cognition

## 2023-03-14 NOTE — OCCUPATIONAL THERAPY INITIAL EVALUATION ADULT - ADDITIONAL COMMENTS
Per medical record, pt. lives alone in an elevator accessible apartment. Pt. has HHA 24/7 to assist in all ADLs/ IADLs and utilizes a cane at baseline. Per pt. she has glasses and is R handed

## 2023-03-14 NOTE — PROGRESS NOTE ADULT - PROBLEM SELECTOR PLAN 1
Patient presented with an unwitnessed fall today. Denied MATTY, head trauma, cardiac or seizure history, and fecal/urinary incontinence. Patient ambulates with cane at baseline. CTH - no acute intracranial pathology. CT pelvis - Acute nondisplaced comminuted intra-articular fracture right acetabulum. Also mildly displaced fracture inferior right pubic ramus. Lactate 0.9. CK 68  - Ortho consulted, appreciate recs   - PT recommending TRACE  - OT consulted  - UA +trace ketones otherwise negative  - unable to obtain orthostatics, pt unable to stand w/ toe touch weight bearing  - c/w Tylenol 650mg q6 PO PRN for mild pain  - c/w Morphine 2mg PO q8 PRN for severe pain  - c/w lidocaine 4% patch qd  - Fall precautions  - per ortho no surgical intervention at this time  - Toe-Touch WB RLE  - F/u outpatient with ortho Patient presented with an unwitnessed fall today. Denied MATTY, head trauma, cardiac or seizure history, and fecal/urinary incontinence. Patient ambulates with cane at baseline. CTH - no acute intracranial pathology. CT pelvis - Acute nondisplaced comminuted intra-articular fracture right acetabulum. Also mildly displaced fracture inferior right pubic ramus. Lactate 0.9. CK 68  - Ortho consulted, appreciate recs   - PT recommending TRACE  - OT recommending TRACE  - UA +trace ketones otherwise negative  - unable to obtain orthostatics, pt unable to stand w/ toe touch weight bearing  - c/w Tylenol 650mg q6 PO PRN for mild pain  - c/w Morphine 2mg PO q8 PRN for severe pain  - c/w lidocaine 4% patch qd  - Fall precautions  - per ortho no surgical intervention at this time  - Toe-Touch WB RLE  - F/u outpatient with ortho

## 2023-03-14 NOTE — DISCHARGE NOTE PROVIDER - NSDCMRMEDTOKEN_GEN_ALL_CORE_FT
atorvastatin 10 mg oral tablet: 1 tab(s) orally once a day  mirtazapine 7.5 mg oral tablet: 1 tab(s) orally once a day  Multiple Vitamins oral tablet: 1 tab(s) orally once a day  telmisartan 80 mg oral tablet: 1 tab(s) orally once a day  Tylenol 8 Hour 650 mg oral tablet, extended release: 1 tab(s) orally every 8 hours PRN pain   atorvastatin 10 mg oral tablet: 1 tab(s) orally once a day  enoxaparin: 40 milligram(s) subcutaneous once a day  lidocaine 4% topical film: Apply topically to affected area once a day, As Needed for pain  mirtazapine 7.5 mg oral tablet: 1 tab(s) orally once a day  Multiple Vitamins oral tablet: 1 tab(s) orally once a day  polyethylene glycol 3350 oral powder for reconstitution: 17 gram(s) orally once a day, As Needed - for constipation  senna leaf extract oral tablet: 2 tab(s) orally once a day (at bedtime), As Needed - for constipation  telmisartan 80 mg oral tablet: 1 tab(s) orally once a day  Tylenol 8 Hour 650 mg oral tablet, extended release: 1 tab(s) orally every 8 hours PRN pain

## 2023-03-14 NOTE — PROGRESS NOTE ADULT - PROBLEM SELECTOR PLAN 7
Plan:  F: None  E: replete K<4, Mg<2  N: regular  VTE Prophylaxis: Lovenox 30 mg SQ qd  Other: MV tab PO qd, melatonin 3mg PO qhs PRN  C: Full Code  D: CLARA Pt w/ multiple bladder scans showing retention requiring straight catheterization. Dumas catheter placed.  - dumas catheter in place  - d/c w/ dumas and f/u outpatient Pt w/ multiple bladder scans showing retention requiring intermittent straight catheterization. Dumas catheter placed.  - dumas catheter in place  - monitor UOP  - d/c w/ dumas and f/u w/ urology outpatient

## 2023-03-14 NOTE — DISCHARGE NOTE PROVIDER - PROVIDER TOKENS
PROVIDER:[TOKEN:[[id number]],FOLLOWUP:[2 weeks],ESTABLISHEDPATIENT:[T]] PROVIDER:[TOKEN:[[id number]],FOLLOWUP:[2 weeks],ESTABLISHEDPATIENT:[T]],PROVIDER:[TOKEN:[[id number]],FOLLOWUP:[2 weeks]]

## 2023-03-14 NOTE — DISCHARGE NOTE PROVIDER - HOSPITAL COURSE
#Discharge: do not delete    ONEIDA RANDHAWA is a 89y Female with a past medical history of _____    Presented with _____, found to have _____    Problem List/Main Diagnoses (system-based):   #     #     #    Patient was discharged to: (home/TRACE/acute rehab/hospice, etc. and w/ home health/home PT/RN/home O2)    New medications:   Changes to old medications:  Medications that were stopped:    Items to follow up as outpatient: #Discharge: do not delete    ONEIDA RANDHAWA is a 89F w/ PMHx of HTN, HLD, dementia, s/p R knee replacement presents to [facility] ED after an unwitnessed fall at home.     Hospital Course by Problem List/Main Diagnoses:   #Fall.   Patient presented with an unwitnessed fall today. Denied MATTY, head trauma, cardiac or seizure history, and fecal/urinary incontinence. Patient ambulates with cane at baseline. CTH - no acute intracranial pathology. CT pelvis - Acute nondisplaced comminuted intra-articular fracture right acetabulum. Also mildly displaced fracture inferior right pubic ramus. Lactate 0.9. CK 68  - Ortho consulted, appreciate recs   - PT recommending TRACE  - OT consulted  - UA +trace ketones otherwise negative  - unable to obtain orthostatics, pt unable to stand w/ toe touch weight bearing  - c/w Tylenol 650mg q6 PO PRN for mild pain  - c/w Morphine 2mg PO q8 PRN for severe pain  - c/w lidocaine 4% patch qd  - Fall precautions  - per ortho no surgical intervention at this time  - Toe-Touch WB RLE  - F/u outpatient with ortho.    #HTN (hypertension).   Known history of HTN. On home Telmisartan 80mg qd  - c/w therapeutic interchange losartan 100mg PO qd.    #Dementia.   Patient with known dementia. Baseline AAOx1. Per chart review, legal  makes medical decisions. On home mirtazapine 7.5mg qd  - c/w home med mirtazapine 7.5 mg PO qd.    #Hyperlipemia.   Hx of HLD. On home lipitor 10mg   - c/w home lipitor 10mg PO qhs.    #Constipation.   - c/w miralax 17g PO qd  - c/w senna 2 tabs PO qhs.    Patient was discharged to: (home/[facility]/acute rehab/hospice, etc. and w/ home health/home PT/RN/home O2)    New medications:   Changes to old medications:  Medications that were stopped:    Items to follow up as outpatient: #Discharge: do not delete    ONEIDA RANDHAWA is a 89F w/ PMHx of HTN, HLD, dementia, s/p R knee replacement presents to [facility] ED after an unwitnessed fall at home.     Hospital Course by Problem List/Main Diagnoses:   #Fall.   Patient presented with an unwitnessed fall today. Denied MATTY, head trauma, cardiac or seizure history, and fecal/urinary incontinence. Patient ambulates with cane at baseline. CTH - no acute intracranial pathology. CT pelvis - Acute nondisplaced comminuted intra-articular fracture right acetabulum. Also mildly displaced fracture inferior right pubic ramus. Patient unable to stand w/ toe touch weight bearing given her dementia. Patient requires strict bedrest until improvement. CW DVT ppx.    #Urinary retention  Functional given her bedbound status. Dawson placed     #HTN (hypertension).   Known history of HTN. On home Telmisartan 80mg qd  - c/w therapeutic interchange losartan 100mg PO qd.    #Dementia.   Patient with known dementia. Baseline AAOx1. Per chart review, legal  makes medical decisions. On home mirtazapine 7.5mg qd  - c/w home med mirtazapine 7.5 mg PO qd.    #Hyperlipemia.   Hx of HLD. On home lipitor 10mg   - c/w home lipitor 10mg PO qhs.    #Constipation.   - c/w miralax 17g PO qd  - c/w senna 2 tabs PO qhs.    Patient was discharged to: [facility]    New medications:   Lovenox for DVT ppx    Changes to old medications:  None     Medications that were stopped:  None     Items to follow up as outpatient:  - TOV   - Repeat BMP

## 2023-03-14 NOTE — PROGRESS NOTE ADULT - SUBJECTIVE AND OBJECTIVE BOX
**INCOMPLETE NOTE    OVERNIGHT EVENTS:    SUBJECTIVE:  Patient seen and examined at bedside.    Vital Signs Last 12 Hrs  T(F): 98.3 (03-14-23 @ 05:45), Max: 98.3 (03-14-23 @ 05:45)  HR: 90 (03-14-23 @ 05:45) (90 - 90)  BP: 145/77 (03-14-23 @ 05:45) (145/77 - 145/77)  BP(mean): --  RR: 18 (03-14-23 @ 05:45) (18 - 18)  SpO2: 96% (03-14-23 @ 05:45) (96% - 96%)  I&O's Summary    13 Mar 2023 07:01  -  14 Mar 2023 07:00  --------------------------------------------------------  IN: 0 mL / OUT: 136 mL / NET: -136 mL        PHYSICAL EXAM:  Constitutional: NAD, comfortable in bed.  HEENT: NC/AT, PERRLA, EOMI, no conjunctival pallor or scleral icterus, MMM  Neck: Supple, no JVD  Respiratory: CTA B/L. No w/r/r.   Cardiovascular: RRR, normal S1 and S2, no m/r/g.   Gastrointestinal: +BS, soft NTND, no guarding or rebound tenderness, no palpable masses   Extremities: wwp; no cyanosis, clubbing or edema.   Vascular: Pulses equal and strong throughout.   Neurological: AAOx3, no CN deficits, strength and sensation intact throughout.   Skin: No gross skin abnormalities or rashes        RADIOLOGY & ADDITIONAL TESTS:    MEDICATIONS  (STANDING):  atorvastatin 10 milliGRAM(s) Oral at bedtime  enoxaparin Injectable 30 milliGRAM(s) SubCutaneous every 24 hours  lidocaine   4% Patch 1 Patch Transdermal every 24 hours  losartan 100 milliGRAM(s) Oral every 24 hours  mirtazapine 7.5 milliGRAM(s) Oral every 24 hours  multivitamin 1 Tablet(s) Oral daily  polyethylene glycol 3350 17 Gram(s) Oral daily  senna 2 Tablet(s) Oral at bedtime    MEDICATIONS  (PRN):  acetaminophen     Tablet .. 650 milliGRAM(s) Oral every 6 hours PRN Temp greater or equal to 38C (100.4F), Mild Pain (1 - 3)  aluminum hydroxide/magnesium hydroxide/simethicone Suspension 30 milliLiter(s) Oral every 4 hours PRN Dyspepsia  melatonin 3 milliGRAM(s) Oral at bedtime PRN Insomnia  morphine   Solution 2 milliGRAM(s) Oral every 6 hours PRN Severe Pain (7 - 10)  ondansetron Injectable 4 milliGRAM(s) IV Push every 8 hours PRN Nausea and/or Vomiting   OVERNIGHT EVENTS: No acute overnight events.    SUBJECTIVE:  Patient seen and examined at bedside. ROS limited by pt's mental status, however she denies pain or any other complaints at this time.    Vital Signs Last 12 Hrs  T(F): 98.3 (03-14-23 @ 05:45), Max: 98.3 (03-14-23 @ 05:45)  HR: 90 (03-14-23 @ 05:45) (90 - 90)  BP: 145/77 (03-14-23 @ 05:45) (145/77 - 145/77)  BP(mean): --  RR: 18 (03-14-23 @ 05:45) (18 - 18)  SpO2: 96% (03-14-23 @ 05:45) (96% - 96%)  I&O's Summary    13 Mar 2023 07:01  -  14 Mar 2023 07:00  --------------------------------------------------------  IN: 0 mL / OUT: 136 mL / NET: -136 mL        PHYSICAL EXAM:  Constitutional: NAD, comfortable in bed.  HEENT: NC/AT, EOMI, no conjunctival pallor or scleral icterus, MMM  Neck: Supple  Respiratory: CTA B/L. No w/r/r.   Cardiovascular: RRR, normal S1 and S2, no m/r/g.   Gastrointestinal: soft NTND, no guarding or rebound tenderness, no palpable masses. mild suprapubic fullness.  Extremities: wwp; no cyanosis, clubbing or edema.   Neurological: AAOx1 (self)        RADIOLOGY & ADDITIONAL TESTS:    MEDICATIONS  (STANDING):  atorvastatin 10 milliGRAM(s) Oral at bedtime  enoxaparin Injectable 30 milliGRAM(s) SubCutaneous every 24 hours  lidocaine   4% Patch 1 Patch Transdermal every 24 hours  losartan 100 milliGRAM(s) Oral every 24 hours  mirtazapine 7.5 milliGRAM(s) Oral every 24 hours  multivitamin 1 Tablet(s) Oral daily  polyethylene glycol 3350 17 Gram(s) Oral daily  senna 2 Tablet(s) Oral at bedtime    MEDICATIONS  (PRN):  acetaminophen     Tablet .. 650 milliGRAM(s) Oral every 6 hours PRN Temp greater or equal to 38C (100.4F), Mild Pain (1 - 3)  aluminum hydroxide/magnesium hydroxide/simethicone Suspension 30 milliLiter(s) Oral every 4 hours PRN Dyspepsia  melatonin 3 milliGRAM(s) Oral at bedtime PRN Insomnia  morphine   Solution 2 milliGRAM(s) Oral every 6 hours PRN Severe Pain (7 - 10)  ondansetron Injectable 4 milliGRAM(s) IV Push every 8 hours PRN Nausea and/or Vomiting

## 2023-03-14 NOTE — DISCHARGE NOTE PROVIDER - NSDCCPCAREPLAN_GEN_ALL_CORE_FT
PRINCIPAL DISCHARGE DIAGNOSIS  Diagnosis: Pelvic fracture  Assessment and Plan of Treatment: You came to the hospital because of a fall. It appears that the cause is “mechanical”. That means that you slipped, tripped or lost your balance. If your fall had been due to fainting or a seizure, further tests would be required. Health conditions which change your blood pressure, vision, muscle strength and coordination may increase your risk for falls. Medication can also increase your risk if they make you dizzy, weak, or sleepy. To prevent falls, you can stand or sit up slowly, use assistive devices as directed, pwear shoes that fit well and have soles that , wear a personal alarm, stay active, and manage your medical conditions. Home safety tips include keeping your path clear, removing small rugs, not walking on wet surfaces, installing bright lights at home, and keeping items you use often on shelves within reach. Your right hip xray showed that you have a right hip fracture. You were seen by orthopedic surgery and they determined that you do not need surgery at this time. However, you should continue to work with physical therapy with toe-touch weight bearing restrictions to the right lower extremity. Please follow-up with your primary care provider, Dr. Escamilla, within 2 weeks of discharge from the hospital.

## 2023-03-14 NOTE — OCCUPATIONAL THERAPY INITIAL EVALUATION ADULT - PLANNED THERAPY INTERVENTIONS, OT EVAL
ADL retraining/balance training/bed mobility training/cognitive, visual perceptual/neuromuscular re-education/strengthening/transfer training

## 2023-03-14 NOTE — DISCHARGE NOTE PROVIDER - CARE PROVIDER_API CALL
Trip Escamilla  INTERNAL MEDICINE  [address] [place] [id number]  Phone: [phone]  Fax: [phone]  Established Patient  Follow Up Time: 2 weeks   Trip Escamilla  INTERNAL MEDICINE  [address] [address]  Phone: [phone]  Fax: [phone]  Established Patient  Follow Up Time: 2 weeks    Mathieu Daniel)  Urology  [address]  [place], [place] [id number]  Phone: [phone]  Fax: [phone]  Follow Up Time: 2 weeks

## 2023-03-14 NOTE — DISCHARGE NOTE PROVIDER - DETAILS OF MALNUTRITION DIAGNOSIS/DIAGNOSES
This patient has been assessed with a concern for Malnutrition and was treated during this hospitalization for the following Nutrition diagnosis/diagnoses:     -  03/13/2023: Underweight (BMI < 19)

## 2023-03-15 RX ADMIN — LIDOCAINE 1 PATCH: 4 CREAM TOPICAL at 13:00

## 2023-03-15 RX ADMIN — Medication 1 TABLET(S): at 12:57

## 2023-03-15 RX ADMIN — LIDOCAINE 1 PATCH: 4 CREAM TOPICAL at 01:15

## 2023-03-15 RX ADMIN — ENOXAPARIN SODIUM 30 MILLIGRAM(S): 100 INJECTION SUBCUTANEOUS at 05:43

## 2023-03-15 RX ADMIN — LIDOCAINE 1 PATCH: 4 CREAM TOPICAL at 07:14

## 2023-03-15 RX ADMIN — ATORVASTATIN CALCIUM 10 MILLIGRAM(S): 80 TABLET, FILM COATED ORAL at 22:27

## 2023-03-15 RX ADMIN — MIRTAZAPINE 7.5 MILLIGRAM(S): 45 TABLET, ORALLY DISINTEGRATING ORAL at 22:27

## 2023-03-15 RX ADMIN — LOSARTAN POTASSIUM 100 MILLIGRAM(S): 100 TABLET, FILM COATED ORAL at 05:42

## 2023-03-15 NOTE — PROGRESS NOTE ADULT - SUBJECTIVE AND OBJECTIVE BOX
**INCOMPLETE NOTE    OVERNIGHT EVENTS:    SUBJECTIVE:  Patient seen and examined at bedside.    Vital Signs Last 12 Hrs  T(F): 93.4 (03-15-23 @ 05:37), Max: 93.4 (03-15-23 @ 05:37)  HR: 83 (03-15-23 @ 05:37) (83 - 83)  BP: 140/89 (03-15-23 @ 05:37) (140/89 - 140/89)  BP(mean): 106 (03-15-23 @ 05:37) (106 - 106)  RR: 18 (03-15-23 @ 05:37) (18 - 18)  SpO2: 94% (03-15-23 @ 05:37) (94% - 94%)  I&O's Summary    14 Mar 2023 07:01  -  15 Mar 2023 07:00  --------------------------------------------------------  IN: 0 mL / OUT: 500 mL / NET: -500 mL        PHYSICAL EXAM:  Constitutional: NAD, comfortable in bed.  HEENT: NC/AT, PERRLA, EOMI, no conjunctival pallor or scleral icterus, MMM  Neck: Supple, no JVD  Respiratory: CTA B/L. No w/r/r.   Cardiovascular: RRR, normal S1 and S2, no m/r/g.   Gastrointestinal: +BS, soft NTND, no guarding or rebound tenderness, no palpable masses   Extremities: wwp; no cyanosis, clubbing or edema.   Vascular: Pulses equal and strong throughout.   Neurological: AAOx3, no CN deficits, strength and sensation intact throughout.   Skin: No gross skin abnormalities or rashes        LABS:                  RADIOLOGY & ADDITIONAL TESTS:    MEDICATIONS  (STANDING):  atorvastatin 10 milliGRAM(s) Oral at bedtime  enoxaparin Injectable 30 milliGRAM(s) SubCutaneous every 24 hours  lidocaine   4% Patch 1 Patch Transdermal every 24 hours  losartan 100 milliGRAM(s) Oral every 24 hours  mirtazapine 7.5 milliGRAM(s) Oral every 24 hours  multivitamin 1 Tablet(s) Oral daily    MEDICATIONS  (PRN):  acetaminophen     Tablet .. 650 milliGRAM(s) Oral every 6 hours PRN Temp greater or equal to 38C (100.4F), Mild Pain (1 - 3)  aluminum hydroxide/magnesium hydroxide/simethicone Suspension 30 milliLiter(s) Oral every 4 hours PRN Dyspepsia  melatonin 3 milliGRAM(s) Oral at bedtime PRN Insomnia  morphine   Solution 2 milliGRAM(s) Oral every 6 hours PRN Severe Pain (7 - 10)  ondansetron Injectable 4 milliGRAM(s) IV Push every 8 hours PRN Nausea and/or Vomiting  polyethylene glycol 3350 17 Gram(s) Oral daily PRN Constipation  senna 2 Tablet(s) Oral at bedtime PRN Constipation   OVERNIGHT EVENTS: No acute overnight events.    SUBJECTIVE: Patient seen and examined at bedside. Reports no complaints at this time, denies pain, SOB. ROS limited given pt's mental status.    Vital Signs Last 12 Hrs  T(F): 93.4 (03-15-23 @ 05:37), Max: 93.4 (03-15-23 @ 05:37)  HR: 83 (03-15-23 @ 05:37) (83 - 83)  BP: 140/89 (03-15-23 @ 05:37) (140/89 - 140/89)  BP(mean): 106 (03-15-23 @ 05:37) (106 - 106)  RR: 18 (03-15-23 @ 05:37) (18 - 18)  SpO2: 94% (03-15-23 @ 05:37) (94% - 94%)  I&O's Summary    14 Mar 2023 07:01  -  15 Mar 2023 07:00  --------------------------------------------------------  IN: 0 mL / OUT: 500 mL / NET: -500 mL        PHYSICAL EXAM:  Constitutional: NAD, comfortable in bed.  HEENT: NC/AT, EOMI, no conjunctival pallor or scleral icterus, MMM  Neck: Supple  Respiratory: CTA B/L. No w/r/r.   Cardiovascular: RRR, normal S1 and S2, no m/r/g.   Gastrointestinal: soft NTND, no guarding or rebound tenderness, no palpable masses. mild suprapubic fullness.  Extremities: wwp; no cyanosis, clubbing or edema.   Neurological: AAOx1 (self)        RADIOLOGY & ADDITIONAL TESTS:    MEDICATIONS  (STANDING):  atorvastatin 10 milliGRAM(s) Oral at bedtime  enoxaparin Injectable 30 milliGRAM(s) SubCutaneous every 24 hours  lidocaine   4% Patch 1 Patch Transdermal every 24 hours  losartan 100 milliGRAM(s) Oral every 24 hours  mirtazapine 7.5 milliGRAM(s) Oral every 24 hours  multivitamin 1 Tablet(s) Oral daily    MEDICATIONS  (PRN):  acetaminophen     Tablet .. 650 milliGRAM(s) Oral every 6 hours PRN Temp greater or equal to 38C (100.4F), Mild Pain (1 - 3)  aluminum hydroxide/magnesium hydroxide/simethicone Suspension 30 milliLiter(s) Oral every 4 hours PRN Dyspepsia  melatonin 3 milliGRAM(s) Oral at bedtime PRN Insomnia  morphine   Solution 2 milliGRAM(s) Oral every 6 hours PRN Severe Pain (7 - 10)  ondansetron Injectable 4 milliGRAM(s) IV Push every 8 hours PRN Nausea and/or Vomiting  polyethylene glycol 3350 17 Gram(s) Oral daily PRN Constipation  senna 2 Tablet(s) Oral at bedtime PRN Constipation

## 2023-03-15 NOTE — PROGRESS NOTE ADULT - PROBLEM SELECTOR PLAN 5
Hx of HLD. On home lipitor 10mg   - c/w home lipitor 10mg PO qhs - c/w miralax 17g PO qd  - c/w senna 2 tabs PO qhs

## 2023-03-15 NOTE — PROGRESS NOTE ADULT - PROBLEM SELECTOR PLAN 7
Pt w/ multiple bladder scans showing retention requiring intermittent straight catheterization. Dumas catheter placed.  - dumas catheter in place  - monitor UOP  - d/c w/ dumas and f/u w/ urology outpatient Plan:  F: None  E: replete K<4, Mg<2  N: regular  VTE Prophylaxis: Lovenox 30 mg SQ qd  Other: MV tab PO qd, melatonin 3mg PO qhs PRN  C: FULL CODE  D: CLARA

## 2023-03-15 NOTE — PROGRESS NOTE ADULT - PROBLEM SELECTOR PLAN 6
- c/w miralax 17g PO qd  - c/w senna 2 tabs PO qhs Pt w/ multiple bladder scans showing retention requiring intermittent straight catheterization. Dumas catheter placed.  - dumas catheter in place  - monitor UOP  - d/c w/ dumas and f/u w/ urology outpatient

## 2023-03-15 NOTE — PROGRESS NOTE ADULT - PROBLEM SELECTOR PLAN 4
Patient with known dementia. Baseline AAOx1. Per chart review, legal  makes medical decisions. On home mirtazapine 7.5mg qd  - c/w home med mirtazapine 7.5 mg PO qd Hx of HLD. On home lipitor 10mg   - c/w home lipitor 10mg PO qhs

## 2023-03-15 NOTE — PROGRESS NOTE ADULT - PROBLEM SELECTOR PLAN 3
Known history of HTN. On home Telmisartan 80mg qd  - c/w therapeutic interchange losartan 100mg PO qd Patient with known dementia. Baseline AAOx1. Per chart review, legal  makes medical decisions. On home mirtazapine 7.5mg qd  - c/w home med mirtazapine 7.5 mg PO qd

## 2023-03-15 NOTE — PROGRESS NOTE ADULT - PROBLEM SELECTOR PLAN 1
Patient presented with an unwitnessed fall today. Denied MATTY, head trauma, cardiac or seizure history, and fecal/urinary incontinence. Patient ambulates with cane at baseline. CTH - no acute intracranial pathology. CT pelvis - Acute nondisplaced comminuted intra-articular fracture right acetabulum. Also mildly displaced fracture inferior right pubic ramus. Lactate 0.9. CK 68  - Ortho consulted, appreciate recs   - PT recommending TRACE  - OT recommending TRACE  - UA +trace ketones otherwise negative  - unable to obtain orthostatics, pt unable to stand w/ toe touch weight bearing  - c/w Tylenol 650mg q6 PO PRN for mild pain  - c/w Morphine 2mg PO q8 PRN for severe pain  - c/w lidocaine 4% patch qd  - Fall precautions  - per ortho no surgical intervention at this time  - Toe-Touch WB RLE  - F/u outpatient with ortho Patient presented with an unwitnessed fall today. Denied MATTY, head trauma, cardiac or seizure history, and fecal/urinary incontinence. Patient ambulates with cane at baseline. CTH - no acute intracranial pathology. CT pelvis - Acute nondisplaced comminuted intra-articular fracture right acetabulum. Also mildly displaced fracture inferior right pubic ramus. Lactate 0.9. CK 68. UA +trace ketones otherwise negative. Unable to obtain orthostatics, pt unable to stand w/ toe touch weight bearing (difficult to comprehend and follow instruction i/s/o dementia).  - Ortho consulted -- no surgical intervention at this time  - PT/OT recommending TRACE  - c/w Tylenol 650mg q6 PO PRN for mild pain  - c/w Morphine 2mg PO q8 PRN for severe pain  - c/w lidocaine 4% patch qd  - Fall precautions  - Toe-Touch WB RLE  - F/u outpatient with ortho

## 2023-03-15 NOTE — PROGRESS NOTE ADULT - PROBLEM SELECTOR PLAN 2
Presented with WBC 12K. No additional SIRS criteria. Likely reactive 2/2 fall  - RESOLVED Known history of HTN. On home Telmisartan 80mg qd  - c/w therapeutic interchange losartan 100mg PO qd

## 2023-03-15 NOTE — PROGRESS NOTE ADULT - ASSESSMENT
89F w/ PMHx of HTN, HLD, dementia, s/p R knee replacement presents to [facility] ED after an unwitnessed fall at home.

## 2023-03-16 LAB
ALBUMIN SERPL ELPH-MCNC: 3.2 G/DL — LOW (ref 3.3–5)
ALP SERPL-CCNC: 97 U/L — SIGNIFICANT CHANGE UP (ref 40–120)
ALT FLD-CCNC: 15 U/L — SIGNIFICANT CHANGE UP (ref 10–45)
ANION GAP SERPL CALC-SCNC: 10 MMOL/L — SIGNIFICANT CHANGE UP (ref 5–17)
AST SERPL-CCNC: 25 U/L — SIGNIFICANT CHANGE UP (ref 10–40)
BASOPHILS # BLD AUTO: 0.04 K/UL — SIGNIFICANT CHANGE UP (ref 0–0.2)
BASOPHILS NFR BLD AUTO: 0.5 % — SIGNIFICANT CHANGE UP (ref 0–2)
BILIRUB SERPL-MCNC: 0.7 MG/DL — SIGNIFICANT CHANGE UP (ref 0.2–1.2)
BLD GP AB SCN SERPL QL: NEGATIVE — SIGNIFICANT CHANGE UP
BUN SERPL-MCNC: 35 MG/DL — HIGH (ref 7–23)
CALCIUM SERPL-MCNC: 9.6 MG/DL — SIGNIFICANT CHANGE UP (ref 8.4–10.5)
CHLORIDE SERPL-SCNC: 103 MMOL/L — SIGNIFICANT CHANGE UP (ref 96–108)
CO2 SERPL-SCNC: 22 MMOL/L — SIGNIFICANT CHANGE UP (ref 22–31)
CREAT SERPL-MCNC: 1.29 MG/DL — SIGNIFICANT CHANGE UP (ref 0.5–1.3)
EGFR: 38 ML/MIN/1.73M2 — LOW
EOSINOPHIL # BLD AUTO: 0.21 K/UL — SIGNIFICANT CHANGE UP (ref 0–0.5)
EOSINOPHIL NFR BLD AUTO: 2.6 % — SIGNIFICANT CHANGE UP (ref 0–6)
GLUCOSE SERPL-MCNC: 131 MG/DL — HIGH (ref 70–99)
HCT VFR BLD CALC: 34.3 % — LOW (ref 34.5–45)
HGB BLD-MCNC: 11.4 G/DL — LOW (ref 11.5–15.5)
IMM GRANULOCYTES NFR BLD AUTO: 0.5 % — SIGNIFICANT CHANGE UP (ref 0–0.9)
LACTATE SERPL-SCNC: 0.9 MMOL/L — SIGNIFICANT CHANGE UP (ref 0.5–2)
LYMPHOCYTES # BLD AUTO: 1.14 K/UL — SIGNIFICANT CHANGE UP (ref 1–3.3)
LYMPHOCYTES # BLD AUTO: 14.4 % — SIGNIFICANT CHANGE UP (ref 13–44)
MAGNESIUM SERPL-MCNC: 2 MG/DL — SIGNIFICANT CHANGE UP (ref 1.6–2.6)
MCHC RBC-ENTMCNC: 33.2 GM/DL — SIGNIFICANT CHANGE UP (ref 32–36)
MCHC RBC-ENTMCNC: 33.9 PG — SIGNIFICANT CHANGE UP (ref 27–34)
MCV RBC AUTO: 102.1 FL — HIGH (ref 80–100)
MONOCYTES # BLD AUTO: 0.66 K/UL — SIGNIFICANT CHANGE UP (ref 0–0.9)
MONOCYTES NFR BLD AUTO: 8.3 % — SIGNIFICANT CHANGE UP (ref 2–14)
NEUTROPHILS # BLD AUTO: 5.85 K/UL — SIGNIFICANT CHANGE UP (ref 1.8–7.4)
NEUTROPHILS NFR BLD AUTO: 73.7 % — SIGNIFICANT CHANGE UP (ref 43–77)
NRBC # BLD: 0 /100 WBCS — SIGNIFICANT CHANGE UP (ref 0–0)
PHOSPHATE SERPL-MCNC: 2.7 MG/DL — SIGNIFICANT CHANGE UP (ref 2.5–4.5)
PLATELET # BLD AUTO: 268 K/UL — SIGNIFICANT CHANGE UP (ref 150–400)
POTASSIUM SERPL-MCNC: 4 MMOL/L — SIGNIFICANT CHANGE UP (ref 3.5–5.3)
POTASSIUM SERPL-SCNC: 4 MMOL/L — SIGNIFICANT CHANGE UP (ref 3.5–5.3)
PROT SERPL-MCNC: 6.4 G/DL — SIGNIFICANT CHANGE UP (ref 6–8.3)
RBC # BLD: 3.36 M/UL — LOW (ref 3.8–5.2)
RBC # FLD: 13.1 % — SIGNIFICANT CHANGE UP (ref 10.3–14.5)
RH IG SCN BLD-IMP: POSITIVE — SIGNIFICANT CHANGE UP
SODIUM SERPL-SCNC: 135 MMOL/L — SIGNIFICANT CHANGE UP (ref 135–145)
WBC # BLD: 7.94 K/UL — SIGNIFICANT CHANGE UP (ref 3.8–10.5)
WBC # FLD AUTO: 7.94 K/UL — SIGNIFICANT CHANGE UP (ref 3.8–10.5)

## 2023-03-16 RX ORDER — SODIUM CHLORIDE 9 MG/ML
1000 INJECTION INTRAMUSCULAR; INTRAVENOUS; SUBCUTANEOUS ONCE
Refills: 0 | Status: COMPLETED | OUTPATIENT
Start: 2023-03-16 | End: 2023-03-16

## 2023-03-16 RX ADMIN — ENOXAPARIN SODIUM 30 MILLIGRAM(S): 100 INJECTION SUBCUTANEOUS at 06:12

## 2023-03-16 RX ADMIN — LIDOCAINE 1 PATCH: 4 CREAM TOPICAL at 13:29

## 2023-03-16 RX ADMIN — LIDOCAINE 1 PATCH: 4 CREAM TOPICAL at 07:18

## 2023-03-16 RX ADMIN — LOSARTAN POTASSIUM 100 MILLIGRAM(S): 100 TABLET, FILM COATED ORAL at 06:12

## 2023-03-16 RX ADMIN — ATORVASTATIN CALCIUM 10 MILLIGRAM(S): 80 TABLET, FILM COATED ORAL at 22:16

## 2023-03-16 RX ADMIN — Medication 650 MILLIGRAM(S): at 11:50

## 2023-03-16 RX ADMIN — LIDOCAINE 1 PATCH: 4 CREAM TOPICAL at 22:17

## 2023-03-16 RX ADMIN — LIDOCAINE 1 PATCH: 4 CREAM TOPICAL at 00:06

## 2023-03-16 RX ADMIN — SODIUM CHLORIDE 250 MILLILITER(S): 9 INJECTION INTRAMUSCULAR; INTRAVENOUS; SUBCUTANEOUS at 09:24

## 2023-03-16 RX ADMIN — Medication 1 TABLET(S): at 13:19

## 2023-03-16 RX ADMIN — Medication 3 MILLIGRAM(S): at 22:17

## 2023-03-16 RX ADMIN — MIRTAZAPINE 7.5 MILLIGRAM(S): 45 TABLET, ORALLY DISINTEGRATING ORAL at 22:16

## 2023-03-16 RX ADMIN — Medication 650 MILLIGRAM(S): at 10:47

## 2023-03-16 NOTE — CHART NOTE - NSCHARTNOTEFT_GEN_A_CORE
Admitting Diagnosis:   Patient is a 89y old  Female who presents with a chief complaint of PELVIC FRACTURE     (12 Mar 2023 15:27)      PAST MEDICAL & SURGICAL HISTORY:  Dementia  HTN (hypertension)  S/P total knee replacement, right      Current Nutrition Order: DASH/TLC       PO Intake: Good (%) [   ]  Fair (50-75%) [ x  ] Poor (<25%) [   ]    GI Issues: Fecal incontinence, LBM 3/15 per EMR    Skin Integrity: DTI to R heel, stage 1 PU to R elbow, +1 edema to R hip    Labs:   03-16    135  |  103  |  35<H>  ----------------------------<  131<H>  4.0   |  22  |  1.29    Ca    9.6      16 Mar 2023 05:30  Phos  2.7     03-16  Mg     2.0     03-16    TPro  6.4  /  Alb  3.2<L>  /  TBili  0.7  /  DBili  x   /  AST  25  /  ALT  15  /  AlkPhos  97  03-16      Medications:  MEDICATIONS  (STANDING):  atorvastatin 10 milliGRAM(s) Oral at bedtime  enoxaparin Injectable 30 milliGRAM(s) SubCutaneous every 24 hours  lidocaine   4% Patch 1 Patch Transdermal every 24 hours  losartan 100 milliGRAM(s) Oral every 24 hours  mirtazapine 7.5 milliGRAM(s) Oral every 24 hours  multivitamin 1 Tablet(s) Oral daily    MEDICATIONS  (PRN):  acetaminophen     Tablet .. 650 milliGRAM(s) Oral every 6 hours PRN Temp greater or equal to 38C (100.4F), Mild Pain (1 - 3)  aluminum hydroxide/magnesium hydroxide/simethicone Suspension 30 milliLiter(s) Oral every 4 hours PRN Dyspepsia  melatonin 3 milliGRAM(s) Oral at bedtime PRN Insomnia  morphine   Solution 2 milliGRAM(s) Oral every 6 hours PRN Severe Pain (7 - 10)  ondansetron Injectable 4 milliGRAM(s) IV Push every 8 hours PRN Nausea and/or Vomiting  polyethylene glycol 3350 17 Gram(s) Oral daily PRN Constipation  senna 2 Tablet(s) Oral at bedtime PRN Constipation      Weight:  Height for BMI (FEET)	5 Feet  Height for BMI (INCHES)	4 Inch(s)  Height for BMI (CENTIMETERS)	162.56 Centimeter(s)  Weight for BMI (lbs)	102 lb  Weight for BMI (kg)	46.3 kg  Body Mass Index	17.5     Weight Change: no new weights to assess      Estimated energy needs:   Estimated Energy Needs Weight (lbs)	102 lb  Estimated Energy Needs Weight (kg)	46.2 kg  Estimated Energy Needs From (aki/kg)	25   Estimated Energy Needs To (aki/kg)	30   Estimated Energy Needs Calculated From (aki/kg)	1155   Estimated Energy Needs Calculated To (aki/kg)	1386     Estimated Protein Needs Weight (lbs)	102 lb  Estimated Protein Needs Weight (kg)	46.2 kg  Estimated Protein Needs From (g/kg)	1.2   Estimated Protein Needs To (g/kg)	1.4   Estimated Protein Needs Calculated From (g/kg)	55.44   Estimated Protein Needs Calculated To (g/kg)	64.68     Subjective: 89 y o F w/ PMHx of HTN, HLD, dementia, s/p R knee replacement presents to [facility] ED after an unwitnessed fall at home. Pt now planned for TRACE when medical feasible. 3/11: passed bedside dysphagia screen, ordered diet. A1C 5.4. B12 and folate wnl. PT recommending TRACE. o/n: BS at 12AM -> 131cc. 3/12: 12PM and 6PM BS <200. o/n: BS - 103. 3/16: Cr 1.29 from 0.78 (4 days ago). gave 1L NS over 4 hrs.     Pt assessed for follow up assessment. Pt in care x2 attempts, RN reports good PO intake of 50-75% of meals. Current diet order: DASH/TLC. Fecal incontinence, LBM 3/15 per EMR, on bowel regimen. DTI to R heel, stage 1 PU to R elbow, +1 edema to R hip. BUN high-monitor fluids. Meds: mirtazapine, MVI. RD to f/u prn.    Previous Nutrition Diagnosis: Increased nutrient needs r/t increased demand for PU wound healing AEB stage 1 elbow and suspected DTI to R ear.    Active [  x ]  Resolved [   ]    Goal: Pt to meet at least 75% of nutritional needs consistently     Recommendations:  1. Continue current diet order-consider ONS  2. Pain and bowel regimen per team  3. Monitor electrolytes, cmp    Education: deferred at this time    Risk Level: High [   ] Moderate [ x  ] Low [   ]

## 2023-03-16 NOTE — PROGRESS NOTE ADULT - SUBJECTIVE AND OBJECTIVE BOX
**INCOMPLETE NOTE    OVERNIGHT EVENTS:    SUBJECTIVE:  Patient seen and examined at bedside.    Vital Signs Last 12 Hrs  T(F): 97.6 (03-16-23 @ 05:22), Max: 97.6 (03-16-23 @ 05:22)  HR: 86 (03-16-23 @ 05:22) (86 - 93)  BP: 133/58 (03-16-23 @ 05:22) (121/61 - 133/58)  BP(mean): 81 (03-15-23 @ 20:59) (81 - 81)  RR: 18 (03-16-23 @ 05:22) (17 - 18)  SpO2: 95% (03-16-23 @ 05:22) (95% - 97%)  I&O's Summary    15 Mar 2023 07:01  -  16 Mar 2023 07:00  --------------------------------------------------------  IN: 0 mL / OUT: 450 mL / NET: -450 mL        PHYSICAL EXAM:  Constitutional: NAD, comfortable in bed.  HEENT: NC/AT, PERRLA, EOMI, no conjunctival pallor or scleral icterus, MMM  Neck: Supple, no JVD  Respiratory: CTA B/L. No w/r/r.   Cardiovascular: RRR, normal S1 and S2, no m/r/g.   Gastrointestinal: +BS, soft NTND, no guarding or rebound tenderness, no palpable masses   Extremities: wwp; no cyanosis, clubbing or edema.   Vascular: Pulses equal and strong throughout.   Neurological: AAOx3, no CN deficits, strength and sensation intact throughout.   Skin: No gross skin abnormalities or rashes        LABS:                        11.4   7.94  )-----------( 268      ( 16 Mar 2023 05:30 )             34.3     03-16    135  |  103  |  35<H>  ----------------------------<  131<H>  4.0   |  22  |  1.29    Ca    9.6      16 Mar 2023 05:30  Phos  2.7     03-16  Mg     2.0     03-16    TPro  6.4  /  Alb  3.2<L>  /  TBili  0.7  /  DBili  x   /  AST  25  /  ALT  15  /  AlkPhos  97  03-16            RADIOLOGY & ADDITIONAL TESTS:    MEDICATIONS  (STANDING):  atorvastatin 10 milliGRAM(s) Oral at bedtime  enoxaparin Injectable 30 milliGRAM(s) SubCutaneous every 24 hours  lidocaine   4% Patch 1 Patch Transdermal every 24 hours  losartan 100 milliGRAM(s) Oral every 24 hours  mirtazapine 7.5 milliGRAM(s) Oral every 24 hours  multivitamin 1 Tablet(s) Oral daily    MEDICATIONS  (PRN):  acetaminophen     Tablet .. 650 milliGRAM(s) Oral every 6 hours PRN Temp greater or equal to 38C (100.4F), Mild Pain (1 - 3)  aluminum hydroxide/magnesium hydroxide/simethicone Suspension 30 milliLiter(s) Oral every 4 hours PRN Dyspepsia  melatonin 3 milliGRAM(s) Oral at bedtime PRN Insomnia  morphine   Solution 2 milliGRAM(s) Oral every 6 hours PRN Severe Pain (7 - 10)  ondansetron Injectable 4 milliGRAM(s) IV Push every 8 hours PRN Nausea and/or Vomiting  polyethylene glycol 3350 17 Gram(s) Oral daily PRN Constipation  senna 2 Tablet(s) Oral at bedtime PRN Constipation   OVERNIGHT EVENTS: No acute overnight events.    SUBJECTIVE: Patient seen and examined at bedside. Reports no complaints at this time, denies CP, SOB, abd pain. ROS limited by dementia.    Vital Signs Last 12 Hrs  T(F): 97.6 (03-16-23 @ 05:22), Max: 97.6 (03-16-23 @ 05:22)  HR: 86 (03-16-23 @ 05:22) (86 - 93)  BP: 133/58 (03-16-23 @ 05:22) (121/61 - 133/58)  BP(mean): 81 (03-15-23 @ 20:59) (81 - 81)  RR: 18 (03-16-23 @ 05:22) (17 - 18)  SpO2: 95% (03-16-23 @ 05:22) (95% - 97%)  I&O's Summary    15 Mar 2023 07:01  -  16 Mar 2023 07:00  --------------------------------------------------------  IN: 0 mL / OUT: 450 mL / NET: -450 mL        PHYSICAL EXAM:  Constitutional: NAD, comfortable in bed.  HEENT: NC/AT, EOMI, no conjunctival pallor or scleral icterus, MMM  Neck: Supple  Respiratory: CTA B/L. No w/r/r.   Cardiovascular: RRR, normal S1 and S2, no m/r/g.   Gastrointestinal: soft NTND, no guarding or rebound tenderness, no palpable masses. mild suprapubic fullness.  Extremities: wwp; no cyanosis, clubbing or edema.   Neurological: AAOx1 (self)        LABS:                        11.4   7.94  )-----------( 268      ( 16 Mar 2023 05:30 )             34.3     03-16    135  |  103  |  35<H>  ----------------------------<  131<H>  4.0   |  22  |  1.29    Ca    9.6      16 Mar 2023 05:30  Phos  2.7     03-16  Mg     2.0     03-16    TPro  6.4  /  Alb  3.2<L>  /  TBili  0.7  /  DBili  x   /  AST  25  /  ALT  15  /  AlkPhos  97  03-16            RADIOLOGY & ADDITIONAL TESTS:    MEDICATIONS  (STANDING):  atorvastatin 10 milliGRAM(s) Oral at bedtime  enoxaparin Injectable 30 milliGRAM(s) SubCutaneous every 24 hours  lidocaine   4% Patch 1 Patch Transdermal every 24 hours  losartan 100 milliGRAM(s) Oral every 24 hours  mirtazapine 7.5 milliGRAM(s) Oral every 24 hours  multivitamin 1 Tablet(s) Oral daily    MEDICATIONS  (PRN):  acetaminophen     Tablet .. 650 milliGRAM(s) Oral every 6 hours PRN Temp greater or equal to 38C (100.4F), Mild Pain (1 - 3)  aluminum hydroxide/magnesium hydroxide/simethicone Suspension 30 milliLiter(s) Oral every 4 hours PRN Dyspepsia  melatonin 3 milliGRAM(s) Oral at bedtime PRN Insomnia  morphine   Solution 2 milliGRAM(s) Oral every 6 hours PRN Severe Pain (7 - 10)  ondansetron Injectable 4 milliGRAM(s) IV Push every 8 hours PRN Nausea and/or Vomiting  polyethylene glycol 3350 17 Gram(s) Oral daily PRN Constipation  senna 2 Tablet(s) Oral at bedtime PRN Constipation

## 2023-03-16 NOTE — PROGRESS NOTE ADULT - PROBLEM SELECTOR PLAN 6
Pt w/ multiple bladder scans showing retention requiring intermittent straight catheterization. Dumas catheter placed.  - dumas catheter in place  - monitor UOP  - d/c w/ dumas and f/u w/ urology outpatient

## 2023-03-16 NOTE — PROGRESS NOTE ADULT - ASSESSMENT
89F w/ PMHx of HTN, HLD, dementia, s/p R knee replacement presents to [facility] ED after an unwitnessed fall at home.   89F w/ PMHx of HTN, HLD, dementia, s/p R knee replacement presents to [facility] ED after an unwitnessed fall at home.

## 2023-03-16 NOTE — PROGRESS NOTE ADULT - PROBLEM SELECTOR PLAN 3
Patient with known dementia. Baseline AAOx1. Per chart review, legal  makes medical decisions. On home mirtazapine 7.5mg qd  - c/w home med mirtazapine 7.5 mg PO qd

## 2023-03-16 NOTE — PROGRESS NOTE ADULT - PROBLEM SELECTOR PLAN 1
Patient presented with an unwitnessed fall today. Denied MATTY, head trauma, cardiac or seizure history, and fecal/urinary incontinence. Patient ambulates with cane at baseline. CTH - no acute intracranial pathology. CT pelvis - Acute nondisplaced comminuted intra-articular fracture right acetabulum. Also mildly displaced fracture inferior right pubic ramus. Lactate 0.9. CK 68. UA +trace ketones otherwise negative. Unable to obtain orthostatics, pt unable to stand w/ toe touch weight bearing (difficult to comprehend and follow instruction i/s/o dementia).  - Ortho consulted -- no surgical intervention at this time  - PT/OT recommending TRACE  - c/w Tylenol 650mg q6 PO PRN for mild pain  - c/w Morphine 2mg PO q8 PRN for severe pain  - c/w lidocaine 4% patch qd  - Fall precautions  - Toe-Touch WB RLE  - F/u outpatient with ortho

## 2023-03-17 ENCOUNTER — TRANSCRIPTION ENCOUNTER (OUTPATIENT)
Age: 88
End: 2023-03-17

## 2023-03-17 VITALS
HEART RATE: 74 BPM | RESPIRATION RATE: 18 BRPM | OXYGEN SATURATION: 95 % | TEMPERATURE: 98 F | DIASTOLIC BLOOD PRESSURE: 70 MMHG | SYSTOLIC BLOOD PRESSURE: 146 MMHG

## 2023-03-17 LAB
ALBUMIN SERPL ELPH-MCNC: 3.4 G/DL — SIGNIFICANT CHANGE UP (ref 3.3–5)
ALP SERPL-CCNC: 104 U/L — SIGNIFICANT CHANGE UP (ref 40–120)
ALT FLD-CCNC: 19 U/L — SIGNIFICANT CHANGE UP (ref 10–45)
ANION GAP SERPL CALC-SCNC: 8 MMOL/L — SIGNIFICANT CHANGE UP (ref 5–17)
ANION GAP SERPL CALC-SCNC: 8 MMOL/L — SIGNIFICANT CHANGE UP (ref 5–17)
AST SERPL-CCNC: 28 U/L — SIGNIFICANT CHANGE UP (ref 10–40)
BASOPHILS # BLD AUTO: 0.04 K/UL — SIGNIFICANT CHANGE UP (ref 0–0.2)
BASOPHILS NFR BLD AUTO: 0.7 % — SIGNIFICANT CHANGE UP (ref 0–2)
BILIRUB SERPL-MCNC: 0.6 MG/DL — SIGNIFICANT CHANGE UP (ref 0.2–1.2)
BUN SERPL-MCNC: 30 MG/DL — HIGH (ref 7–23)
BUN SERPL-MCNC: 31 MG/DL — HIGH (ref 7–23)
CALCIUM SERPL-MCNC: 9.4 MG/DL — SIGNIFICANT CHANGE UP (ref 8.4–10.5)
CALCIUM SERPL-MCNC: 9.8 MG/DL — SIGNIFICANT CHANGE UP (ref 8.4–10.5)
CHLORIDE SERPL-SCNC: 107 MMOL/L — SIGNIFICANT CHANGE UP (ref 96–108)
CHLORIDE SERPL-SCNC: 109 MMOL/L — HIGH (ref 96–108)
CO2 SERPL-SCNC: 24 MMOL/L — SIGNIFICANT CHANGE UP (ref 22–31)
CO2 SERPL-SCNC: 25 MMOL/L — SIGNIFICANT CHANGE UP (ref 22–31)
CREAT SERPL-MCNC: 1.09 MG/DL — SIGNIFICANT CHANGE UP (ref 0.5–1.3)
CREAT SERPL-MCNC: 1.21 MG/DL — SIGNIFICANT CHANGE UP (ref 0.5–1.3)
EGFR: 41 ML/MIN/1.73M2 — LOW
EGFR: 46 ML/MIN/1.73M2 — LOW
EOSINOPHIL # BLD AUTO: 0.2 K/UL — SIGNIFICANT CHANGE UP (ref 0–0.5)
EOSINOPHIL NFR BLD AUTO: 3.6 % — SIGNIFICANT CHANGE UP (ref 0–6)
GLUCOSE SERPL-MCNC: 112 MG/DL — HIGH (ref 70–99)
GLUCOSE SERPL-MCNC: 118 MG/DL — HIGH (ref 70–99)
HCT VFR BLD CALC: 34 % — LOW (ref 34.5–45)
HGB BLD-MCNC: 11.5 G/DL — SIGNIFICANT CHANGE UP (ref 11.5–15.5)
IMM GRANULOCYTES NFR BLD AUTO: 0.4 % — SIGNIFICANT CHANGE UP (ref 0–0.9)
LACTATE SERPL-SCNC: 0.7 MMOL/L — SIGNIFICANT CHANGE UP (ref 0.5–2)
LYMPHOCYTES # BLD AUTO: 1 K/UL — SIGNIFICANT CHANGE UP (ref 1–3.3)
LYMPHOCYTES # BLD AUTO: 17.9 % — SIGNIFICANT CHANGE UP (ref 13–44)
MAGNESIUM SERPL-MCNC: 2 MG/DL — SIGNIFICANT CHANGE UP (ref 1.6–2.6)
MCHC RBC-ENTMCNC: 33.8 GM/DL — SIGNIFICANT CHANGE UP (ref 32–36)
MCHC RBC-ENTMCNC: 34 PG — SIGNIFICANT CHANGE UP (ref 27–34)
MCV RBC AUTO: 100.6 FL — HIGH (ref 80–100)
MONOCYTES # BLD AUTO: 0.39 K/UL — SIGNIFICANT CHANGE UP (ref 0–0.9)
MONOCYTES NFR BLD AUTO: 7 % — SIGNIFICANT CHANGE UP (ref 2–14)
NEUTROPHILS # BLD AUTO: 3.94 K/UL — SIGNIFICANT CHANGE UP (ref 1.8–7.4)
NEUTROPHILS NFR BLD AUTO: 70.4 % — SIGNIFICANT CHANGE UP (ref 43–77)
NRBC # BLD: 0 /100 WBCS — SIGNIFICANT CHANGE UP (ref 0–0)
PHOSPHATE SERPL-MCNC: 3.3 MG/DL — SIGNIFICANT CHANGE UP (ref 2.5–4.5)
PLATELET # BLD AUTO: 340 K/UL — SIGNIFICANT CHANGE UP (ref 150–400)
POTASSIUM SERPL-MCNC: 4 MMOL/L — SIGNIFICANT CHANGE UP (ref 3.5–5.3)
POTASSIUM SERPL-MCNC: 4.2 MMOL/L — SIGNIFICANT CHANGE UP (ref 3.5–5.3)
POTASSIUM SERPL-SCNC: 4 MMOL/L — SIGNIFICANT CHANGE UP (ref 3.5–5.3)
POTASSIUM SERPL-SCNC: 4.2 MMOL/L — SIGNIFICANT CHANGE UP (ref 3.5–5.3)
PROT SERPL-MCNC: 6.5 G/DL — SIGNIFICANT CHANGE UP (ref 6–8.3)
RBC # BLD: 3.38 M/UL — LOW (ref 3.8–5.2)
RBC # FLD: 13.2 % — SIGNIFICANT CHANGE UP (ref 10.3–14.5)
SODIUM SERPL-SCNC: 140 MMOL/L — SIGNIFICANT CHANGE UP (ref 135–145)
SODIUM SERPL-SCNC: 141 MMOL/L — SIGNIFICANT CHANGE UP (ref 135–145)
WBC # BLD: 5.59 K/UL — SIGNIFICANT CHANGE UP (ref 3.8–10.5)
WBC # FLD AUTO: 5.59 K/UL — SIGNIFICANT CHANGE UP (ref 3.8–10.5)

## 2023-03-17 PROCEDURE — 97116 GAIT TRAINING THERAPY: CPT

## 2023-03-17 PROCEDURE — 83605 ASSAY OF LACTIC ACID: CPT

## 2023-03-17 PROCEDURE — 83735 ASSAY OF MAGNESIUM: CPT

## 2023-03-17 PROCEDURE — G1004: CPT

## 2023-03-17 PROCEDURE — 85025 COMPLETE CBC W/AUTO DIFF WBC: CPT

## 2023-03-17 PROCEDURE — 73552 X-RAY EXAM OF FEMUR 2/>: CPT

## 2023-03-17 PROCEDURE — 81003 URINALYSIS AUTO W/O SCOPE: CPT

## 2023-03-17 PROCEDURE — 97110 THERAPEUTIC EXERCISES: CPT

## 2023-03-17 PROCEDURE — 83036 HEMOGLOBIN GLYCOSYLATED A1C: CPT

## 2023-03-17 PROCEDURE — 80053 COMPREHEN METABOLIC PANEL: CPT

## 2023-03-17 PROCEDURE — 97165 OT EVAL LOW COMPLEX 30 MIN: CPT

## 2023-03-17 PROCEDURE — 72192 CT PELVIS W/O DYE: CPT | Mod: MG

## 2023-03-17 PROCEDURE — 99285 EMERGENCY DEPT VISIT HI MDM: CPT

## 2023-03-17 PROCEDURE — 97530 THERAPEUTIC ACTIVITIES: CPT

## 2023-03-17 PROCEDURE — 80048 BASIC METABOLIC PNL TOTAL CA: CPT

## 2023-03-17 PROCEDURE — 86901 BLOOD TYPING SEROLOGIC RH(D): CPT

## 2023-03-17 PROCEDURE — 86850 RBC ANTIBODY SCREEN: CPT

## 2023-03-17 PROCEDURE — U0003: CPT

## 2023-03-17 PROCEDURE — 87635 SARS-COV-2 COVID-19 AMP PRB: CPT

## 2023-03-17 PROCEDURE — 82607 VITAMIN B-12: CPT

## 2023-03-17 PROCEDURE — 97161 PT EVAL LOW COMPLEX 20 MIN: CPT

## 2023-03-17 PROCEDURE — 71045 X-RAY EXAM CHEST 1 VIEW: CPT

## 2023-03-17 PROCEDURE — 70450 CT HEAD/BRAIN W/O DYE: CPT | Mod: MG

## 2023-03-17 PROCEDURE — 73502 X-RAY EXAM HIP UNI 2-3 VIEWS: CPT

## 2023-03-17 PROCEDURE — U0005: CPT

## 2023-03-17 PROCEDURE — 82550 ASSAY OF CK (CPK): CPT

## 2023-03-17 PROCEDURE — 73564 X-RAY EXAM KNEE 4 OR MORE: CPT

## 2023-03-17 PROCEDURE — 84100 ASSAY OF PHOSPHORUS: CPT

## 2023-03-17 PROCEDURE — 36415 COLL VENOUS BLD VENIPUNCTURE: CPT

## 2023-03-17 PROCEDURE — 82746 ASSAY OF FOLIC ACID SERUM: CPT

## 2023-03-17 PROCEDURE — 86900 BLOOD TYPING SEROLOGIC ABO: CPT

## 2023-03-17 RX ADMIN — ENOXAPARIN SODIUM 30 MILLIGRAM(S): 100 INJECTION SUBCUTANEOUS at 05:49

## 2023-03-17 RX ADMIN — LOSARTAN POTASSIUM 100 MILLIGRAM(S): 100 TABLET, FILM COATED ORAL at 05:49

## 2023-03-17 RX ADMIN — Medication 1 TABLET(S): at 12:10

## 2023-03-17 RX ADMIN — LIDOCAINE 1 PATCH: 4 CREAM TOPICAL at 06:54

## 2023-03-17 RX ADMIN — LIDOCAINE 1 PATCH: 4 CREAM TOPICAL at 12:38

## 2023-03-17 NOTE — PROGRESS NOTE ADULT - PROVIDER SPECIALTY LIST ADULT
Internal Medicine
Rehab Medicine
Internal Medicine
Rehab Medicine
Internal Medicine

## 2023-03-17 NOTE — DISCHARGE NOTE NURSING/CASE MANAGEMENT/SOCIAL WORK - PATIENT PORTAL LINK FT
You can access the FollowMyHealth Patient Portal offered by [facility] by registering at the following website: [email or web address]. By joining [employer]’s FollowMyHealth portal, you will also be able to view your health information using other applications (apps) compatible with our system.

## 2023-03-17 NOTE — PROGRESS NOTE ADULT - SUBJECTIVE AND OBJECTIVE BOX
**INCOMPLETE NOTE    OVERNIGHT EVENTS:    SUBJECTIVE:  Patient seen and examined at bedside.    Vital Signs Last 12 Hrs  T(F): 97.6 (03-17-23 @ 06:26), Max: 97.6 (03-17-23 @ 06:26)  HR: 74 (03-17-23 @ 06:26) (74 - 74)  BP: 123/62 (03-17-23 @ 06:26) (123/62 - 135/60)  BP(mean): 85 (03-16-23 @ 20:45) (85 - 85)  RR: 17 (03-16-23 @ 20:45) (17 - 17)  SpO2: 98% (03-17-23 @ 06:26) (98% - 99%)  I&O's Summary    16 Mar 2023 07:01  -  17 Mar 2023 07:00  --------------------------------------------------------  IN: 0 mL / OUT: 450 mL / NET: -450 mL        PHYSICAL EXAM:  Constitutional: NAD, comfortable in bed.  HEENT: NC/AT, PERRLA, EOMI, no conjunctival pallor or scleral icterus, MMM  Neck: Supple, no JVD  Respiratory: CTA B/L. No w/r/r.   Cardiovascular: RRR, normal S1 and S2, no m/r/g.   Gastrointestinal: +BS, soft NTND, no guarding or rebound tenderness, no palpable masses   Extremities: wwp; no cyanosis, clubbing or edema.   Vascular: Pulses equal and strong throughout.   Neurological: AAOx3, no CN deficits, strength and sensation intact throughout.   Skin: No gross skin abnormalities or rashes        LABS:                        11.4   7.94  )-----------( 268      ( 16 Mar 2023 05:30 )             34.3     03-17    141  |  109<H>  |  31<H>  ----------------------------<  112<H>  4.2   |  24  |  1.21    Ca    9.4      17 Mar 2023 06:20  Phos  2.7     03-16  Mg     2.0     03-16    TPro  6.4  /  Alb  3.2<L>  /  TBili  0.7  /  DBili  x   /  AST  25  /  ALT  15  /  AlkPhos  97  03-16            RADIOLOGY & ADDITIONAL TESTS:    MEDICATIONS  (STANDING):  atorvastatin 10 milliGRAM(s) Oral at bedtime  enoxaparin Injectable 30 milliGRAM(s) SubCutaneous every 24 hours  lidocaine   4% Patch 1 Patch Transdermal every 24 hours  losartan 100 milliGRAM(s) Oral every 24 hours  mirtazapine 7.5 milliGRAM(s) Oral every 24 hours  multivitamin 1 Tablet(s) Oral daily    MEDICATIONS  (PRN):  acetaminophen     Tablet .. 650 milliGRAM(s) Oral every 6 hours PRN Temp greater or equal to 38C (100.4F), Mild Pain (1 - 3)  aluminum hydroxide/magnesium hydroxide/simethicone Suspension 30 milliLiter(s) Oral every 4 hours PRN Dyspepsia  melatonin 3 milliGRAM(s) Oral at bedtime PRN Insomnia  morphine   Solution 2 milliGRAM(s) Oral every 6 hours PRN Severe Pain (7 - 10)  ondansetron Injectable 4 milliGRAM(s) IV Push every 8 hours PRN Nausea and/or Vomiting  polyethylene glycol 3350 17 Gram(s) Oral daily PRN Constipation  senna 2 Tablet(s) Oral at bedtime PRN Constipation

## 2023-03-17 NOTE — PROGRESS NOTE ADULT - ASSESSMENT
per Internal Medicine    89 y o F w/ PMHx of HTN, HLD, dementia, s/p R knee replacement presents to [facility] ED after an unwitnessed fall at home.      Problem/Plan - 1:  ·  Problem: Fall.   ·  Plan: Patient presented with an unwitnessed fall today. Denied MATTY, head trauma, cardiac or seizure history, and fecal/urinary incontinence. Patient ambulates with cane at baseline. CTH - no acute intracranial pathology. CT pelvis - Acute nondisplaced comminuted intra-articular fracture right acetabulum. Also mildly displaced fracture inferior right pubic ramus. Lactate 0.9. CK 68. UA +trace ketones otherwise negative. Unable to obtain orthostatics, pt unable to stand w/ toe touch weight bearing (difficult to comprehend and follow instruction i/s/o dementia).  - Ortho consulted -- no surgical intervention at this time  - PT/OT recommending TRACE  - c/w Tylenol 650mg q6 PO PRN for mild pain  - c/w Morphine 2mg PO q8 PRN for severe pain  - c/w lidocaine 4% patch qd  - Fall precautions  - Toe-Touch WB RLE  - F/u outpatient with ortho.    Problem/Plan - 2:  ·  Problem: HTN (hypertension).   ·  Plan: Known history of HTN. On home Telmisartan 80mg qd  - c/w therapeutic interchange losartan 100mg PO qd.    Problem/Plan - 3:  ·  Problem: Dementia.   ·  Plan: Patient with known dementia. Baseline AAOx1. Per chart review, legal  makes medical decisions. On home mirtazapine 7.5mg qd  - c/w home med mirtazapine 7.5 mg PO qd.    Problem/Plan - 4:  ·  Problem: Hyperlipemia.   ·  Plan: Hx of HLD. On home lipitor 10mg   - c/w home lipitor 10mg PO qhs.    Problem/Plan - 5:  ·  Problem: Constipation.   ·  Plan: - c/w miralax 17g PO qd  - c/w senna 2 tabs PO qhs.    Problem/Plan - 6:  ·  Problem: Urinary retention.   ·  Plan: Pt w/ multiple bladder scans showing retention requiring intermittent straight catheterization. Dumas catheter placed.  - dumas catheter in place  - monitor UOP  - d/c w/ dumas and f/u w/ urology outpatient.    Problem/Plan - 7:  ·  Problem: Need for prophylactic measure.   ·  Plan: Plan:  F: None  E: replete K<4, Mg<2  N: regular  VTE Prophylaxis: Lovenox 30 mg SQ qd  Other: MV tab PO qd, melatonin 3mg PO qhs PRN  C: FULL CODE  D: [facility].

## 2023-03-17 NOTE — PROGRESS NOTE ADULT - NUTRITIONAL ASSESSMENT
This patient has been assessed with a concern for Malnutrition and has been determined to have a diagnosis/diagnoses of Underweight (BMI < 19).    This patient is being managed with:   Diet DASH/TLC-  Sodium & Cholesterol Restricted  Entered: Mar 11 2023  7:24AM    

## 2023-03-17 NOTE — PROGRESS NOTE ADULT - ASSESSMENT
89F w/ PMHx of HTN, HLD, dementia, s/p R knee replacement presents to [facility] ED after an unwitnessed fall at home.

## 2023-03-17 NOTE — PROGRESS NOTE ADULT - PROBLEM SELECTOR PLAN 1
Patient presented with an unwitnessed fall today. Denied MATTY, head trauma, cardiac or seizure history, and fecal/urinary incontinence. Patient ambulates with cane at baseline. CTH - no acute intracranial pathology. CT pelvis - Acute nondisplaced comminuted intra-articular fracture right acetabulum. Also mildly displaced fracture inferior right pubic ramus. Lactate 0.9. CK 68. UA +trace ketones otherwise negative. Unable to obtain orthostatics, pt unable to stand w/ toe touch weight bearing (difficult to comprehend and follow instruction i/s/o dementia).  - Ortho consulted -- no surgical intervention at this time  - PT/OT recommending TARCE  - c/w Tylenol 650mg q6 PO PRN for mild pain  - c/w Morphine 2mg PO q8 PRN for severe pain  - c/w lidocaine 4% patch qd  - Fall precautions  - Toe-Touch WB RLE  - F/u outpatient with ortho

## 2023-03-17 NOTE — PROGRESS NOTE ADULT - SUBJECTIVE AND OBJECTIVE BOX
Physical Medicine and Rehabilitation Progress Note :       Patient is a 89y old  Female who presents with a chief complaint of Fall  (14 Mar 2023 11:32)      HPI:  HPI:  89F w/ PMHx of HTN, HLD, dementia, s/p R knee replacement presents to [facility] ED after an unwitnessed fall at home. Collateral obtained from HHA at bedside, patient was sitting up in bed and eating food. The HHA walked to another room and heard a loud noise soon after. Upon arrival, patient was found sitting on the floor. Denied seizures, tongue injury, urinary or fecal incontinence during this fall. Unknown if patient LOC or had head trauma. Patient does not have known cardiac history. Patient is no on ASA or anticoagulation. Patient normally ambulates with a cane. ROS limited by underlying dementia. Patient is currently endorsing hip pain, however denies headaches, acute chest pain, SOB, palpitations, abdominal pain, genitourinary symptoms.    ED Course:  Vitals: 98 F, HR 82, /77, RR 18(99%) on Room air  Labs: WBC 12.3, Neutrophils 82%, BUN 16, Cr 0.84, Lactate 0.9, Ck 68  Imaging: CTH - no acute intracranial pathology. CT pelvis - Acute nondisplaced comminuted intra-articular fracture right acetabulum.  EKG: NSR w/ 1 AVB, normal axis, Q waves V2  Consults: Ortho  Interventions: Tylenol 650mg PO x1   (10 Mar 2023 22:02)                            11.5   5.59  )-----------( 340      ( 17 Mar 2023 10:00 )             34.0       03-17    140  |  107  |  30<H>  ----------------------------<  118<H>  4.0   |  25  |  1.09    Ca    9.8      17 Mar 2023 10:00  Phos  3.3     03-17  Mg     2.0     03-17    TPro  6.5  /  Alb  3.4  /  TBili  0.6  /  DBili  x   /  AST  28  /  ALT  19  /  AlkPhos  104  03-17    Vital Signs Last 24 Hrs  T(C): 36.4 (17 Mar 2023 09:57), Max: 36.4 (17 Mar 2023 06:26)  T(F): 97.5 (17 Mar 2023 09:57), Max: 97.6 (17 Mar 2023 06:26)  HR: 74 (17 Mar 2023 09:57) (68 - 74)  BP: 146/70 (17 Mar 2023 09:57) (109/57 - 146/70)  BP(mean): 85 (16 Mar 2023 20:45) (85 - 85)  RR: 18 (17 Mar 2023 09:57) (17 - 18)  SpO2: 95% (17 Mar 2023 09:57) (95% - 99%)    Parameters below as of 17 Mar 2023 09:57  Patient On (Oxygen Delivery Method): room air        MEDICATIONS  (STANDING):  atorvastatin 10 milliGRAM(s) Oral at bedtime  enoxaparin Injectable 30 milliGRAM(s) SubCutaneous every 24 hours  lidocaine   4% Patch 1 Patch Transdermal every 24 hours  losartan 100 milliGRAM(s) Oral every 24 hours  mirtazapine 7.5 milliGRAM(s) Oral every 24 hours  multivitamin 1 Tablet(s) Oral daily    MEDICATIONS  (PRN):  acetaminophen     Tablet .. 650 milliGRAM(s) Oral every 6 hours PRN Temp greater or equal to 38C (100.4F), Mild Pain (1 - 3)  aluminum hydroxide/magnesium hydroxide/simethicone Suspension 30 milliLiter(s) Oral every 4 hours PRN Dyspepsia  melatonin 3 milliGRAM(s) Oral at bedtime PRN Insomnia  morphine   Solution 2 milliGRAM(s) Oral every 6 hours PRN Severe Pain (7 - 10)  ondansetron Injectable 4 milliGRAM(s) IV Push every 8 hours PRN Nausea and/or Vomiting  polyethylene glycol 3350 17 Gram(s) Oral daily PRN Constipation  senna 2 Tablet(s) Oral at bedtime PRN Constipation      3/16/2023:  Physical / Occupational Therapy Functional Status Assessment :         Safety      -[facility] Functional Assessment: Basic Mobility  Type of Assessment: Daily assessment  Turning from your back to your side while in a flat bed without using bedrails?: 3 = A little assistance  Moving from lying on your back to sitting on the flat side of a flat bed without using bedrails?: 3 = A little assistance  Moving to and from a bed to a chair (including a wheelchair)?: 3 = A little assistance  Standing up from a chair using your arms (e.g. wheelchair or bedside chair)?: 3 = A little assistance  Walking in hospital room?: 2 = A lot of assistance  Climbing 3-5 steps with a railing?: 2 = A lot of assistance  Score: 16   Row Comment: Ask the patient "How much help from another person do you currently need? (If the patient hasn't done an activity recently, how much help from another person do you think he/she needs if he/she tried?)    Cognitive/Neuro      Cognitive/Neuro/Behavioral  Level of Consciousness: confused;  alert  Arousal Level: arouses to voice  Orientation: disoriented to;  place;  time;  situation  Speech: clear  Mood/Behavior: calm;  cooperative    Language Assistance  Preferred Language to Address Healthcare Preferred Language to Address Healthcare: English    Therapeutic Interventions      Bed Mobility  Bed Mobility Training Rolling/Turning: minimum assist (75% patient effort);  2 person assist  Bed Mobility Training Scooting: minimum assist (75% patient effort);  2 person assist  Bed Mobility Training Sit-to-Supine: minimum assist (75% patient effort);  2 person assist  Bed Mobility Training Supine-to-Sit: minimum assist (75% patient effort);  2 person assist    Sit-Stand Transfer Training  Transfer Training Sit-to-Stand Transfer: minimum assist (75% patient effort);  2 person assist;  rolling walker  Transfer Training Stand-to-Sit Transfer: minimum assist (75% patient effort);  2 person assist;  rolling walker  Sit-to-Stand Transfer Training Transfer Safety Analysis: decreased weight-shifting ability;  decreased sequencing ability;  decreased balance;  impaired balance;  decreased strength;  impaired postural control      Upper Body Dressing Training  Upper Body Dressing Training Charges: don gown   Upper Body Dressing Training Assistance: maximum assist (25% patient effort);  1 person assist;  cognitive, decreased safety awareness;  impaired balance;  impaired postural control;  decreased strength            PM&R Impression : as above    Current Disposition Plan Recommendations :    subacute rehab placement

## 2023-03-27 DIAGNOSIS — R33.9 RETENTION OF URINE, UNSPECIFIED: ICD-10-CM

## 2023-03-27 DIAGNOSIS — E78.5 HYPERLIPIDEMIA, UNSPECIFIED: ICD-10-CM
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
## 2023-03-27 DIAGNOSIS — I10 ESSENTIAL (PRIMARY) HYPERTENSION: ICD-10-CM

## 2023-03-27 DIAGNOSIS — K59.00 CONSTIPATION, UNSPECIFIED: ICD-10-CM
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
## 2023-03-27 DIAGNOSIS — S32.591A OTHER SPECIFIED FRACTURE OF RIGHT PUBIS, INITIAL ENCOUNTER FOR CLOSED FRACTURE: ICD-10-CM

## 2023-03-27 DIAGNOSIS — S32.491A OTHER SPECIFIED FRACTURE OF RIGHT ACETABULUM, INITIAL ENCOUNTER FOR CLOSED FRACTURE: ICD-10-CM
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
## 2023-03-27 DIAGNOSIS — R63.6 UNDERWEIGHT: ICD-10-CM

## 2023-03-27 DIAGNOSIS — F03.90 UNSPECIFIED DEMENTIA, UNSPECIFIED SEVERITY, WITHOUT BEHAVIORAL DISTURBANCE, PSYCHOTIC DISTURBANCE, MOOD DISTURBANCE, AND ANXIETY: ICD-10-CM

## 2023-04-25 NOTE — ED PROVIDER NOTE - CADM POA PRESS ULCER
[facility] called in with care connection of [place] stating pt is in severe pain on her left upper hip and the patients wants to know what we are going to do for her.  Please advise and call margarito back at [phone] No

## 2023-04-27 PROBLEM — I10 ESSENTIAL (PRIMARY) HYPERTENSION: Chronic | Status: ACTIVE | Noted: 2023-03-10

## 2023-04-28 ENCOUNTER — APPOINTMENT (OUTPATIENT)
Dept: UROLOGY | Facility: CLINIC | Age: 88
End: 2023-04-28
Payer: COMMERCIAL

## 2023-04-28 VITALS
DIASTOLIC BLOOD PRESSURE: 75 MMHG | SYSTOLIC BLOOD PRESSURE: 135 MMHG | HEART RATE: 290 BPM | OXYGEN SATURATION: 76 % | TEMPERATURE: 98 F | BODY MASS INDEX: 19.63 KG/M2 | WEIGHT: 100 LBS | HEIGHT: 60 IN

## 2023-04-28 DIAGNOSIS — Z86.59 PERSONAL HISTORY OF OTHER MENTAL AND BEHAVIORAL DISORDERS: ICD-10-CM

## 2023-04-28 DIAGNOSIS — Z86.39 PERSONAL HISTORY OF OTHER ENDOCRINE, NUTRITIONAL AND METABOLIC DISEASE: ICD-10-CM

## 2023-04-28 PROCEDURE — 99203 OFFICE O/P NEW LOW 30 MIN: CPT

## 2023-04-28 NOTE — PHYSICAL EXAM
[General Appearance - Well Developed] : well developed [General Appearance - Well Nourished] : well nourished [Normal Appearance] : normal appearance [Well Groomed] : well groomed [General Appearance - In No Acute Distress] : no acute distress [Urinary Bladder Findings] : the bladder was normal on palpation [Affect] : the affect was normal [Mood] : the mood was normal [Not Anxious] : not anxious [Abdomen Soft] : soft [Abdomen Tenderness] : non-tender [Abdomen Mass (___ Cm)] : no abdominal mass palpated [Costovertebral Angle Tenderness] : no ~M costovertebral angle tenderness [FreeTextEntry1] : Moderate cognitive dysfunction consistent with some degree of cerebral atrophy

## 2023-04-28 NOTE — HISTORY OF PRESENT ILLNESS
[FreeTextEntry1] : 89 YO F seen TODAY 4/28/2023 as NPT for a catheter consultation, accompanied by her home health aide. She was hospitalized at [facility] mid March after suffering a pelvic fracture from a fall at home. She was discharged to a subacute rehab for 2 weeks, and returned back home in April. A Dawson catheter was inserted during the hospitalization, re-inserted at the rehab center due to urinary retention. The Dawson catheter has been inserted for one month. She has home physical therapy at home.\par NKMA\par lipitor, aspirin 81 mg, multivitamin, mirtazapine, losartan

## 2023-04-28 NOTE — ASSESSMENT
SUBJECTIVE:      Tino Delgado is a 60 year old male presenting with a tick bite.    Now there is a red spot.  Found a tick on the back of the left thigh.  [place]    Patient feels that he feels physically fine without fever chills nausea or joint aches. Does have frequent exposure to ticks.     No history of Lyme's.    No sick contacts.    ALLERGIES:  ALLERGIES: no known allergies.    MEDICATIONS:    Current Outpatient Prescriptions   Medication   • triamcinolone (ARISTOCORT) 0.1 % ointment   • simvastatin (ZOCOR) 20 MG tablet     No current facility-administered medications for this visit.        Past Medical History:   Diagnosis Date   • Hyperlipidemia        Past Surgical History:   Procedure Laterality Date   • Cataract extraction w/  intraocular lens implant Left 08/10/2017   • Colonoscopy diagnostic  01/21/2009    No lesions, repeat colon in 10 years (Dr. Guaman)   • Knee scope,diagnostic  2000    Left knee arthroscopy, meniscal repair (Dr. Cespedes)       SOCIAL HISTORY:  Social History   Substance Use Topics   • Smoking status: Never Smoker   • Smokeless tobacco: Never Used   • Alcohol use 0.6 oz/week     1 Standard drinks or equivalent per week      Comment: socially       OBJECTIVE:   Blood pressure 136/84, pulse 68, weight 85 kg.  Left posterior thigh with raised nodule approximately 3 mm in size without surrounding characteristic bull's-eye.    ASSESSMENT/PLAN:    ASSESSMENT: (W57.XXXA) Tick bite, initial encounter  (primary encounter diagnosis)  Comment: 200 mg doxycycline one-time prophylactic dose provided.  -Lyme screening blood test to be done in one week, 2 weeks from suspected exposure to help reduce risk of false-negative result.  Follow-up based on results.    Patient did voice understanding and support of plan.       [FreeTextEntry1] : Her Dawson catheter is is draining clear yellow urine. Since she is being seen on Friday afternoon, we will maintain her Dawson catheter over the weekend. She will return next Tuesday for a voiding trial. Cheyanne Aldridge MD\par

## 2023-05-02 ENCOUNTER — APPOINTMENT (OUTPATIENT)
Dept: UROLOGY | Facility: CLINIC | Age: 88
End: 2023-05-02
Payer: MEDICARE

## 2023-05-02 VITALS
HEART RATE: 104 BPM | TEMPERATURE: 97 F | DIASTOLIC BLOOD PRESSURE: 71 MMHG | SYSTOLIC BLOOD PRESSURE: 148 MMHG | OXYGEN SATURATION: 97 %

## 2023-05-02 PROCEDURE — 51798 US URINE CAPACITY MEASURE: CPT

## 2023-05-02 PROCEDURE — 99213 OFFICE O/P EST LOW 20 MIN: CPT

## 2023-05-02 NOTE — HISTORY OF PRESENT ILLNESS
[FreeTextEntry1] : 89 YO F seen  4/28/2023 as NPT for a catheter consultation, accompanied by her home health aide. She was hospitalized at [facility] mid March after suffering a pelvic fracture from a fall at home. She was discharged to a subacute rehab for 2 weeks, and returned back home in April. A Dawson catheter was inserted during the hospitalization, re-inserted at the rehab center due to urinary retention. The Dawson catheter has been inserted for one month. She has home physical therapy at home.\par NKMA\par lipitor, aspirin 81 mg, multivitamin, mirtazapine, losartan\par Her Dawson catheter is draining clear yellow urine. Since she is being seen today, on Friday afternoon, we will maintain her Dawson catheter over the weekend. She will return next Tuesday for a voiding trial. \par \par Patient seen TODAY 5/2/2023 for TOV. Dawson removed after instilling 180 ml H2O, patient voided > 110 ml, PVR 68 ml.\par .

## 2023-05-02 NOTE — ASSESSMENT
[FreeTextEntry1] : Successful VT  TOV today. Patient and aide advised to continue H2O intake, monitor urine output, contact the office  or come in for any problems, otherwise FU 2 - 3 weeks for PVR and C+S. Cheyanne Aldridge MD\par

## 2023-05-02 NOTE — PHYSICAL EXAM
[General Appearance - Well Developed] : well developed [General Appearance - Well Nourished] : well nourished [Normal Appearance] : normal appearance [Well Groomed] : well groomed [General Appearance - In No Acute Distress] : no acute distress [FreeTextEntry1] : Ariane, ambulating with the walker and the help of her aide. [Abdomen Tenderness] : non-tender [Abdomen Mass (___ Cm)] : no abdominal mass palpated [Affect] : the affect was normal [Mood] : the mood was normal [Not Anxious] : not anxious

## 2023-06-06 ENCOUNTER — APPOINTMENT (OUTPATIENT)
Dept: UROLOGY | Facility: CLINIC | Age: 88
End: 2023-06-06
Payer: MEDICARE

## 2023-06-06 PROCEDURE — 99213 OFFICE O/P EST LOW 20 MIN: CPT

## 2023-06-06 PROCEDURE — 51798 US URINE CAPACITY MEASURE: CPT

## 2023-06-06 PROCEDURE — 81003 URINALYSIS AUTO W/O SCOPE: CPT | Mod: QW

## 2023-06-06 NOTE — ASSESSMENT
[FreeTextEntry1] : PAtient remains stable off therapy with an empty bladder after urination. We discussed the benefits of remaining well hydrated and I recommended this to the patient and her aide. Urine sent for C+S,if negative, ten FU 6 months. We would only treat a symptomatic UTI. Cheyanne Aldridge MD\par

## 2023-06-06 NOTE — HISTORY OF PRESENT ILLNESS
[FreeTextEntry1] : 89 YO F seen  4/28/2023 as NPT for a catheter consultation, accompanied by her home health aide. She was hospitalized at [facility] mid March after suffering a pelvic fracture from a fall at home. She was discharged to a subacute rehab for 2 weeks, and returned back home in April. A Dawson catheter was inserted during the hospitalization, re-inserted at the rehab center due to urinary retention. The Dawson catheter has been inserted for one month. She has home physical therapy at home.\par NKMA\par lipitor, aspirin 81 mg, multivitamin, mirtazapine, losartan\par Her Dawson catheter is draining clear yellow urine. Since she is being seen today, on Friday afternoon, we will maintain her Dawson catheter over the weekend. She will return next Tuesday for a voiding trial. \par \par Patient seen  5/2/2023 for TOV. Dawson removed after instilling 180 ml H2O, patient voided > 110 ml, PVR 68 ml.\par Successful VT TOV today. Patient and aide advised to continue H2O intake, monitor urine output, contact the office or come in for any problems, otherwise FU 2 - 3 weeks for PVR and C+S. \par \par Patient seen TODAY 6/6/2023 for PVR, C+S. She is accompanied by her home health aide. She denies dysuria. Her aide states that she does not drink water very frequently\par \par UA: nitrites +\par PVR 2 cc\par

## 2023-06-08 LAB
BILIRUB UR QL STRIP: NORMAL
CLARITY UR: CLEAR
COLLECTION METHOD: NORMAL
GLUCOSE UR-MCNC: NORMAL
HCG UR QL: 2 EU/DL
HGB UR QL STRIP.AUTO: NORMAL
KETONES UR-MCNC: NORMAL
LEUKOCYTE ESTERASE UR QL STRIP: NORMAL
NITRITE UR QL STRIP: NORMAL
PH UR STRIP: 6
PROT UR STRIP-MCNC: 30
SP GR UR STRIP: 1.02

## 2023-06-09 ENCOUNTER — NON-APPOINTMENT (OUTPATIENT)
Age: 88
End: 2023-06-09

## 2023-06-12 ENCOUNTER — NON-APPOINTMENT (OUTPATIENT)
Age: 88
End: 2023-06-12

## 2023-06-14 LAB — BACTERIA UR CULT: ABNORMAL

## 2024-01-10 NOTE — HISTORY OF PRESENT ILLNESS
[FreeTextEntry1] : 89 YO F seen  4/28/2023 as NPT for a catheter consultation, accompanied by her home health aide. She was hospitalized at [facility] mid March after suffering a pelvic fracture from a fall at home. She was discharged to a subacute rehab for 2 weeks, and returned back home in April. A Dawson catheter was inserted during the hospitalization, re-inserted at the rehab center due to urinary retention. The Dawson catheter has been inserted for one month. She has home physical therapy at home. NKMA lipitor, aspirin 81 mg, multivitamin, mirtazapine, losartan Her Dawson catheter is draining clear yellow urine. Since she is being seen today, on Friday afternoon, we will maintain her Dawson catheter over the weekend. She will return next Tuesday for a voiding trial.   Patient seen  5/2/2023 for TOV. Dawson removed after instilling 180 ml H2O, patient voided > 110 ml, PVR 68 ml. Successful VT TOV today. Patient and aide advised to continue H2O intake, monitor urine output, contact the office or come in for any problems, otherwise FU 2 - 3 weeks for PVR and C+S.   Patient seen TODAY 6/6/2023 for PVR, C+S. She is accompanied by her home health aide. She denies dysuria. Her aide states that she does not drink water very frequently UA: nitrites + PVR 2 cc Patient remains stable off therapy with an empty bladder after urination. We discussed the benefits of remaining well hydrated and I recommended this to the patient and her aide. Urine sent for C+S, if negative, ten FU 6 months. We would only treat a symptomatic UTI. C+S >100K E. Coli, > 100K Aerococcus, asymptomatic, no treatment indicated.  Patient seen TODAY 1/11/2024 to reassess.

## 2024-01-11 ENCOUNTER — APPOINTMENT (OUTPATIENT)
Dept: UROLOGY | Facility: CLINIC | Age: 89
End: 2024-01-11

## 2024-02-18 ENCOUNTER — INPATIENT (INPATIENT)
Facility: HOSPITAL | Age: 89
LOS: 1 days | Discharge: HOME | DRG: 690 | End: 2024-02-20
Attending: INTERNAL MEDICINE | Admitting: INTERNAL MEDICINE
Payer: MEDICARE

## 2024-02-18 VITALS
TEMPERATURE: 98 F | OXYGEN SATURATION: 99 % | WEIGHT: 119.93 LBS | DIASTOLIC BLOOD PRESSURE: 73 MMHG | HEART RATE: 76 BPM | SYSTOLIC BLOOD PRESSURE: 200 MMHG | RESPIRATION RATE: 18 BRPM

## 2024-02-18 DIAGNOSIS — I10 ESSENTIAL (PRIMARY) HYPERTENSION: ICD-10-CM

## 2024-02-18 DIAGNOSIS — R41.82 ALTERED MENTAL STATUS, UNSPECIFIED: ICD-10-CM

## 2024-02-18 DIAGNOSIS — N39.0 URINARY TRACT INFECTION, SITE NOT SPECIFIED: ICD-10-CM

## 2024-02-18 DIAGNOSIS — Z96.651 PRESENCE OF RIGHT ARTIFICIAL KNEE JOINT: Chronic | ICD-10-CM

## 2024-02-18 DIAGNOSIS — F03.90 UNSPECIFIED DEMENTIA WITHOUT BEHAVIORAL DISTURBANCE: ICD-10-CM

## 2024-02-18 DIAGNOSIS — Z29.9 ENCOUNTER FOR PROPHYLACTIC MEASURES, UNSPECIFIED: ICD-10-CM

## 2024-02-18 LAB
ALBUMIN SERPL ELPH-MCNC: 4.3 G/DL — SIGNIFICANT CHANGE UP (ref 3.3–5)
ALP SERPL-CCNC: 87 U/L — SIGNIFICANT CHANGE UP (ref 40–120)
ALT FLD-CCNC: 12 U/L — SIGNIFICANT CHANGE UP (ref 10–45)
ANION GAP SERPL CALC-SCNC: 10 MMOL/L — SIGNIFICANT CHANGE UP (ref 5–17)
APPEARANCE UR: ABNORMAL
AST SERPL-CCNC: 34 U/L — SIGNIFICANT CHANGE UP (ref 10–40)
BACTERIA # UR AUTO: ABNORMAL /HPF
BILIRUB SERPL-MCNC: 0.9 MG/DL — SIGNIFICANT CHANGE UP (ref 0.2–1.2)
BILIRUB UR-MCNC: NEGATIVE — SIGNIFICANT CHANGE UP
BUN SERPL-MCNC: 15 MG/DL — SIGNIFICANT CHANGE UP (ref 7–23)
CALCIUM SERPL-MCNC: 10.1 MG/DL — SIGNIFICANT CHANGE UP (ref 8.4–10.5)
CAST: 0 /LPF — SIGNIFICANT CHANGE UP (ref 0–4)
CHLORIDE SERPL-SCNC: 102 MMOL/L — SIGNIFICANT CHANGE UP (ref 96–108)
CO2 SERPL-SCNC: 27 MMOL/L — SIGNIFICANT CHANGE UP (ref 22–31)
COD CRY URNS QL: PRESENT
COLOR SPEC: YELLOW — SIGNIFICANT CHANGE UP
CREAT SERPL-MCNC: 0.67 MG/DL — SIGNIFICANT CHANGE UP (ref 0.5–1.3)
DIFF PNL FLD: NEGATIVE — SIGNIFICANT CHANGE UP
EGFR: 79 ML/MIN/1.73M2 — SIGNIFICANT CHANGE UP
FLUAV AG NPH QL: SIGNIFICANT CHANGE UP
FLUBV AG NPH QL: SIGNIFICANT CHANGE UP
GLUCOSE SERPL-MCNC: 108 MG/DL — HIGH (ref 70–99)
GLUCOSE UR QL: NEGATIVE MG/DL — SIGNIFICANT CHANGE UP
HCT VFR BLD CALC: 46.1 % — HIGH (ref 34.5–45)
HGB BLD-MCNC: 15.4 G/DL — SIGNIFICANT CHANGE UP (ref 11.5–15.5)
KETONES UR-MCNC: NEGATIVE MG/DL — SIGNIFICANT CHANGE UP
LEUKOCYTE ESTERASE UR-ACNC: ABNORMAL
MCHC RBC-ENTMCNC: 33.4 GM/DL — SIGNIFICANT CHANGE UP (ref 32–36)
MCHC RBC-ENTMCNC: 33.7 PG — SIGNIFICANT CHANGE UP (ref 27–34)
MCV RBC AUTO: 100.9 FL — HIGH (ref 80–100)
NITRITE UR-MCNC: NEGATIVE — SIGNIFICANT CHANGE UP
NRBC # BLD: 0 /100 WBCS — SIGNIFICANT CHANGE UP (ref 0–0)
PH UR: 7 — SIGNIFICANT CHANGE UP (ref 5–8)
PLATELET # BLD AUTO: 285 K/UL — SIGNIFICANT CHANGE UP (ref 150–400)
POTASSIUM SERPL-MCNC: 4.2 MMOL/L — SIGNIFICANT CHANGE UP (ref 3.5–5.3)
POTASSIUM SERPL-SCNC: 4.2 MMOL/L — SIGNIFICANT CHANGE UP (ref 3.5–5.3)
PROT SERPL-MCNC: 8.2 G/DL — SIGNIFICANT CHANGE UP (ref 6–8.3)
PROT UR-MCNC: 30 MG/DL
RBC # BLD: 4.57 M/UL — SIGNIFICANT CHANGE UP (ref 3.8–5.2)
RBC # FLD: 12.7 % — SIGNIFICANT CHANGE UP (ref 10.3–14.5)
RBC CASTS # UR COMP ASSIST: 5 /HPF — HIGH (ref 0–4)
RSV RNA NPH QL NAA+NON-PROBE: SIGNIFICANT CHANGE UP
SARS-COV-2 RNA SPEC QL NAA+PROBE: SIGNIFICANT CHANGE UP
SODIUM SERPL-SCNC: 139 MMOL/L — SIGNIFICANT CHANGE UP (ref 135–145)
SP GR SPEC: 1.02 — SIGNIFICANT CHANGE UP (ref 1–1.03)
SQUAMOUS # UR AUTO: 0 /HPF — SIGNIFICANT CHANGE UP (ref 0–5)
UROBILINOGEN FLD QL: 2 MG/DL (ref 0.2–1)
WBC # BLD: 8.93 K/UL — SIGNIFICANT CHANGE UP (ref 3.8–10.5)
WBC # FLD AUTO: 8.93 K/UL — SIGNIFICANT CHANGE UP (ref 3.8–10.5)
WBC UR QL: 107 /HPF — HIGH (ref 0–5)

## 2024-02-18 PROCEDURE — 51703 INSERT BLADDER CATH COMPLEX: CPT

## 2024-02-18 PROCEDURE — 99285 EMERGENCY DEPT VISIT HI MDM: CPT

## 2024-02-18 PROCEDURE — 71045 X-RAY EXAM CHEST 1 VIEW: CPT | Mod: 26

## 2024-02-18 PROCEDURE — 70450 CT HEAD/BRAIN W/O DYE: CPT | Mod: 26,MA

## 2024-02-18 RX ORDER — LABETALOL HCL 100 MG
100 TABLET ORAL ONCE
Refills: 0 | Status: COMPLETED | OUTPATIENT
Start: 2024-02-18 | End: 2024-02-18

## 2024-02-18 RX ORDER — CEFTRIAXONE 500 MG/1
1000 INJECTION, POWDER, FOR SOLUTION INTRAMUSCULAR; INTRAVENOUS EVERY 24 HOURS
Refills: 0 | Status: DISCONTINUED | OUTPATIENT
Start: 2024-02-19 | End: 2024-02-20

## 2024-02-18 RX ORDER — SODIUM CHLORIDE 9 MG/ML
1000 INJECTION, SOLUTION INTRAVENOUS
Refills: 0 | Status: DISCONTINUED | OUTPATIENT
Start: 2024-02-18 | End: 2024-02-20

## 2024-02-18 RX ORDER — LANOLIN ALCOHOL/MO/W.PET/CERES
3 CREAM (GRAM) TOPICAL AT BEDTIME
Refills: 0 | Status: DISCONTINUED | OUTPATIENT
Start: 2024-02-18 | End: 2024-02-20

## 2024-02-18 RX ORDER — ACETAMINOPHEN 500 MG
650 TABLET ORAL EVERY 6 HOURS
Refills: 0 | Status: DISCONTINUED | OUTPATIENT
Start: 2024-02-18 | End: 2024-02-20

## 2024-02-18 RX ORDER — MIRTAZAPINE 45 MG/1
7.5 TABLET, ORALLY DISINTEGRATING ORAL AT BEDTIME
Refills: 0 | Status: DISCONTINUED | OUTPATIENT
Start: 2024-02-18 | End: 2024-02-20

## 2024-02-18 RX ORDER — ENOXAPARIN SODIUM 100 MG/ML
40 INJECTION SUBCUTANEOUS EVERY 24 HOURS
Refills: 0 | Status: DISCONTINUED | OUTPATIENT
Start: 2024-02-18 | End: 2024-02-20

## 2024-02-18 RX ORDER — CEFTRIAXONE 500 MG/1
1000 INJECTION, POWDER, FOR SOLUTION INTRAMUSCULAR; INTRAVENOUS ONCE
Refills: 0 | Status: COMPLETED | OUTPATIENT
Start: 2024-02-18 | End: 2024-02-18

## 2024-02-18 RX ORDER — ATORVASTATIN CALCIUM 80 MG/1
10 TABLET, FILM COATED ORAL AT BEDTIME
Refills: 0 | Status: DISCONTINUED | OUTPATIENT
Start: 2024-02-18 | End: 2024-02-20

## 2024-02-18 RX ADMIN — CEFTRIAXONE 100 MILLIGRAM(S): 500 INJECTION, POWDER, FOR SOLUTION INTRAMUSCULAR; INTRAVENOUS at 13:53

## 2024-02-18 RX ADMIN — SODIUM CHLORIDE 80 MILLILITER(S): 9 INJECTION, SOLUTION INTRAVENOUS at 16:54

## 2024-02-18 NOTE — H&P ADULT - PROBLEM SELECTOR PLAN 3
Systolic up to 200 in ED which came down as patient became les agitated. Previously on telmisartan 80mg daily. |     - If patient HTN would ensure she is not in pain or retaining, if other etiology of inpatient HTN r/o would give low dose losartan or lisinopril which has quicker onset of action.

## 2024-02-18 NOTE — PATIENT PROFILE ADULT - FALL HARM RISK - HARM RISK INTERVENTIONS

## 2024-02-18 NOTE — ED PROVIDER NOTE - CLINICAL SUMMARY MEDICAL DECISION MAKING FREE TEXT BOX
ekg with NSR, no VIRGILIO, normal intervals. ekg with NSR, no VIRGILIO, normal intervals. ua indicative of uti, likely cause of delirium. screening ct negative. will admit

## 2024-02-18 NOTE — ED PROVIDER NOTE - OBJECTIVE STATEMENT
89F hx HTN, HLD, dementia, s/p R knee replacement presenting for altered mental state. patient was in her usual state of health prior to going to sleep at 7PM last night. awoke at 3 am and was speaking in a confusedd manner to her HHA who is currently at bedside. again upon awakening in the AM was confused relative to her baseline and didn't get out bed to urinate like she normally does. usually ambulate with assistance. now back at baseline mental status. 89F hx HTN, HLD, dementia, s/p R knee replacement presenting for altered mental state. patient was in her usual state of health prior to going to sleep at 7PM last night. awoke at 3 am and was speaking in a confusedd manner to her HHA who is currently at bedside. again upon awakening in the AM was confused relative to her baseline and didn't get out bed to urinate like she normally does. usually ambulate with assistance. now imrpoved with less confusion, but still not abck at her baseline.

## 2024-02-18 NOTE — ED ADULT TRIAGE NOTE - CHIEF COMPLAINT QUOTE
Patient PMH dementia (AAOX4 at baseline per aide) to the ED via EMS c/o AMS. LKN 1900 last night, woke up altered, unable to follow commands or hold conversation. Unable to complete BEFAST screening due to inability to follow commands. 's per EMS, NAD.

## 2024-02-18 NOTE — H&P ADULT - PROBLEM SELECTOR PLAN 5
F: ---  E: Maintain K >4, Mg >2  N: pending bedside dysphagia   VTE ppx: Lovenox  Dispo: RMF  ---  CODE: FULL for now, pending confirmation with HCP F: LR 80cc/hr  E: Maintain K >4, Mg >2  N: pending bedside dysphagia   VTE ppx: Lovenox  Dispo: F  ---  CODE: FULL for now, pending confirmation with HCP

## 2024-02-18 NOTE — ED ADULT NURSE NOTE - OBJECTIVE STATEMENT
BIBEMS from home with HHA (24 hours) for AMS since waking this morning, went to bed at 7pm last night without symptoms, denies pain/falls/known trauma. HHA reports baseline conversational and aox4- was not oriented to location or situation this morning, has improved since then.     On assessment- AOx3 with mod situational disorientation, breathing even and unlabored on RA, no apparent distress, VSS in triage x htn, able to speak in clear coherent sentences, gait assessment defered, neuro intact with no apparent facial asymmetry, PERRLA.

## 2024-02-18 NOTE — H&P ADULT - NSHPPHYSICALEXAM_GEN_ALL_CORE
General: Alert and oriented x 2 (name, year), awake, lethargic, mumbling to herself. No acute distress.   Eyes: EOMI. Anicteric.  HEENT: Moist mucous membranes. No scleral icterus. No cervical lymphadenopathy.  Cardiovascular: Tachycardic, regular rhythm. No murmur. No JVD.  Lungs: Clear to auscultation bilaterally. No accessory muscle use.  Abdomen: Soft, non-distended. No palpable masses. Suprapubic tenderness to palpation. NO CVA tenderness  Extremities: Moving all 4 extremities equally.  Skin: No rashes or lesions. Warm.  Neurologic: L-sided eyelid droop and pupillary dilation, chronic. Unable to assess full neuro exam.   Psychiatric: Cooperative. Appropriate mood and affect.

## 2024-02-18 NOTE — H&P ADULT - ASSESSMENT
89 y/o F with PMHx of HTN, HLD, dementia, s/p R knee replacement presents to [facility] ED after sudden hallucinations and agitations, found with /73 and bacteruria, admitted for UTI complicated by worsening dementia and hypertensive urgency.

## 2024-02-18 NOTE — PATIENT PROFILE ADULT - FUNCTIONAL ASSESSMENT - BASIC MOBILITY 6.
2-calculated by average/Not able to assess (calculate score using [facility] averaging method)

## 2024-02-18 NOTE — H&P ADULT - PROBLEM SELECTOR PLAN 1
Likely 2/2 UTI given pyuria, suprapubic tenderness. Does not meet sepsis criteria. CTH negative for acute findings or NPH. No acutemetabolic abnormalities or hypoglycemia. No trauma, hypoxia, or ischemic EKG changes. Will treat with abx and obtain RP US and bladder scan to r/u retention and hydro.    - ceftriaxone 1g IV daily  - RP US  - bladder scan  - f/u blood and urine cx

## 2024-02-18 NOTE — H&P ADULT - HISTORY OF PRESENT ILLNESS
Pt is a 89 y/o F with PMHx of HTN, HLD, dementia, s/p R knee replacement presents to [facility] ED after sudden hallucinations and agitations. Per DIANN Salamanca, slept in usual state of health, awoke at 3AM agitated, speaking to hallucinations, and not responding to questions or engaging in conversions.  BP at the time was 184/100.  Of note, PCP removed a bp medication end of November / early December due to hypotensive episodes to 60/40s. At baseline, pt is able to vocalize needs and preferences, follow movie plots and name famous actors in old movies. She experiences similar hallucinations and agitation when distressed (e.g. very hungry), but is usually redirectable and able to be reassured.    Patient verbalized desire to urinate prior to being transported to the ED. However, due to not being amenable to anyone touching her, she did not use the restroom until presenting to the ED.     ED Course-  ·	Initial vital signs: T: 97.6 F, HR: 76, BP: 200/73-->163/68, R: 18, SpO2: 99% on RA  ·	Labs: significant for no leukocytosis, macrocytosis, .9, UA with spec grav 1.024, cloudy, large LE, 107 WBC, calcium oxalate crystals  ·	Imaging:  CXR: no acute infiltrates  CTH: no acute hemorrhage, edema, territorial infarct  ·	EKG: regular rate and rhythm, difficult to see p waves to prove it is sinus due to shaking artifact, PRWP  ·	Medications: ceftriaxone 1g IV  ·	Consults: none

## 2024-02-19 ENCOUNTER — TRANSCRIPTION ENCOUNTER (OUTPATIENT)
Age: 89
End: 2024-02-19

## 2024-02-19 LAB
ALBUMIN SERPL ELPH-MCNC: 3.1 G/DL — LOW (ref 3.3–5)
ALP SERPL-CCNC: 73 U/L — SIGNIFICANT CHANGE UP (ref 40–120)
ALT FLD-CCNC: 11 U/L — SIGNIFICANT CHANGE UP (ref 10–45)
ANION GAP SERPL CALC-SCNC: 12 MMOL/L — SIGNIFICANT CHANGE UP (ref 5–17)
AST SERPL-CCNC: 30 U/L — SIGNIFICANT CHANGE UP (ref 10–40)
BASOPHILS # BLD AUTO: 0.05 K/UL — SIGNIFICANT CHANGE UP (ref 0–0.2)
BASOPHILS NFR BLD AUTO: 0.7 % — SIGNIFICANT CHANGE UP (ref 0–2)
BILIRUB SERPL-MCNC: 0.8 MG/DL — SIGNIFICANT CHANGE UP (ref 0.2–1.2)
BUN SERPL-MCNC: 12 MG/DL — SIGNIFICANT CHANGE UP (ref 7–23)
CALCIUM SERPL-MCNC: 9 MG/DL — SIGNIFICANT CHANGE UP (ref 8.4–10.5)
CHLORIDE SERPL-SCNC: 102 MMOL/L — SIGNIFICANT CHANGE UP (ref 96–108)
CO2 SERPL-SCNC: 22 MMOL/L — SIGNIFICANT CHANGE UP (ref 22–31)
CREAT SERPL-MCNC: 0.64 MG/DL — SIGNIFICANT CHANGE UP (ref 0.5–1.3)
EGFR: 80 ML/MIN/1.73M2 — SIGNIFICANT CHANGE UP
EOSINOPHIL # BLD AUTO: 0.2 K/UL — SIGNIFICANT CHANGE UP (ref 0–0.5)
EOSINOPHIL NFR BLD AUTO: 3 % — SIGNIFICANT CHANGE UP (ref 0–6)
GLUCOSE SERPL-MCNC: 64 MG/DL — LOW (ref 70–99)
HCT VFR BLD CALC: 40.8 % — SIGNIFICANT CHANGE UP (ref 34.5–45)
HGB BLD-MCNC: 13.7 G/DL — SIGNIFICANT CHANGE UP (ref 11.5–15.5)
IMM GRANULOCYTES NFR BLD AUTO: 0.3 % — SIGNIFICANT CHANGE UP (ref 0–0.9)
LYMPHOCYTES # BLD AUTO: 1.79 K/UL — SIGNIFICANT CHANGE UP (ref 1–3.3)
LYMPHOCYTES # BLD AUTO: 26.5 % — SIGNIFICANT CHANGE UP (ref 13–44)
MAGNESIUM SERPL-MCNC: 1.9 MG/DL — SIGNIFICANT CHANGE UP (ref 1.6–2.6)
MCHC RBC-ENTMCNC: 33.6 GM/DL — SIGNIFICANT CHANGE UP (ref 32–36)
MCHC RBC-ENTMCNC: 33.6 PG — SIGNIFICANT CHANGE UP (ref 27–34)
MCV RBC AUTO: 100 FL — SIGNIFICANT CHANGE UP (ref 80–100)
MONOCYTES # BLD AUTO: 0.61 K/UL — SIGNIFICANT CHANGE UP (ref 0–0.9)
MONOCYTES NFR BLD AUTO: 9 % — SIGNIFICANT CHANGE UP (ref 2–14)
NEUTROPHILS # BLD AUTO: 4.09 K/UL — SIGNIFICANT CHANGE UP (ref 1.8–7.4)
NEUTROPHILS NFR BLD AUTO: 60.5 % — SIGNIFICANT CHANGE UP (ref 43–77)
NRBC # BLD: 0 /100 WBCS — SIGNIFICANT CHANGE UP (ref 0–0)
PHOSPHATE SERPL-MCNC: 3.1 MG/DL — SIGNIFICANT CHANGE UP (ref 2.5–4.5)
PLATELET # BLD AUTO: 239 K/UL — SIGNIFICANT CHANGE UP (ref 150–400)
POTASSIUM SERPL-MCNC: 4.3 MMOL/L — SIGNIFICANT CHANGE UP (ref 3.5–5.3)
POTASSIUM SERPL-SCNC: 4.3 MMOL/L — SIGNIFICANT CHANGE UP (ref 3.5–5.3)
PROT SERPL-MCNC: 6.4 G/DL — SIGNIFICANT CHANGE UP (ref 6–8.3)
RBC # BLD: 4.08 M/UL — SIGNIFICANT CHANGE UP (ref 3.8–5.2)
RBC # FLD: 13 % — SIGNIFICANT CHANGE UP (ref 10.3–14.5)
SODIUM SERPL-SCNC: 136 MMOL/L — SIGNIFICANT CHANGE UP (ref 135–145)
WBC # BLD: 6.76 K/UL — SIGNIFICANT CHANGE UP (ref 3.8–10.5)
WBC # FLD AUTO: 6.76 K/UL — SIGNIFICANT CHANGE UP (ref 3.8–10.5)

## 2024-02-19 RX ORDER — POLYETHYLENE GLYCOL 3350 17 G/17G
17 POWDER, FOR SOLUTION ORAL
Refills: 0 | Status: DISCONTINUED | OUTPATIENT
Start: 2024-02-19 | End: 2024-02-20

## 2024-02-19 RX ORDER — SENNA PLUS 8.6 MG/1
2 TABLET ORAL AT BEDTIME
Refills: 0 | Status: DISCONTINUED | OUTPATIENT
Start: 2024-02-19 | End: 2024-02-20

## 2024-02-19 RX ADMIN — MIRTAZAPINE 7.5 MILLIGRAM(S): 45 TABLET, ORALLY DISINTEGRATING ORAL at 21:47

## 2024-02-19 RX ADMIN — ENOXAPARIN SODIUM 40 MILLIGRAM(S): 100 INJECTION SUBCUTANEOUS at 06:25

## 2024-02-19 RX ADMIN — ATORVASTATIN CALCIUM 10 MILLIGRAM(S): 80 TABLET, FILM COATED ORAL at 21:46

## 2024-02-19 RX ADMIN — CEFTRIAXONE 100 MILLIGRAM(S): 500 INJECTION, POWDER, FOR SOLUTION INTRAMUSCULAR; INTRAVENOUS at 13:12

## 2024-02-19 RX ADMIN — SENNA PLUS 2 TABLET(S): 8.6 TABLET ORAL at 21:47

## 2024-02-19 RX ADMIN — POLYETHYLENE GLYCOL 3350 17 GRAM(S): 17 POWDER, FOR SOLUTION ORAL at 18:02

## 2024-02-19 NOTE — DISCHARGE NOTE PROVIDER - NSDCFUADDAPPT_GEN_ALL_CORE_FT
Please follow up with a urologist for further care of your indwelling Dawson catheter.   Please follow up with a urologist for further care of your indwelling Dawson catheter.    Please bring your Insurance card, Photo ID and Discharge paperwork to the following appointments:    (1) Please follow up with your Primary Care Provider, Dr. Trip Escamilla at [address] [address]on 2/27/2024 at 1:30pm.    Appointment was scheduled by Ms. WILDER Nova, Referral Coordinator.    (2) Please follow up with your Urology Provider, Dr. Mathieu Daniel at [address] [address] on      at     Appointment was scheduled by Ms. WILDER Nova, Referral Coordinator.       Please follow up with a urologist for further care of your indwelling Dawson catheter.    Please bring your Insurance card, Photo ID and Discharge paperwork to the following appointments:    (1) Please follow up with your Primary Care Provider, Dr. Trip Escamilla at [address] [id number] 2/27/2024 at 1:30pm.    Appointment was scheduled by Ms. WILDER Nova, Referral Coordinator.    (2) Please follow up with your Urology Provider, Dr. Mathieu Daniel at [address], [address], [place] [address] on 3/7/2024 at 1:00pm.    Appointment was scheduled by Ms. WILDER Nova, Referral Coordinator.

## 2024-02-19 NOTE — DIETITIAN INITIAL EVALUATION ADULT - OTHER CALCULATIONS
4'9'' IBW 94 pounds +-10%  Wt 119 pounds BMI 25.7 %IPB=007  Upper-IBW used to calculate energy needs due to pt's current body weight exceeding 120% of IBW  Adjust for age/bmi, and current medical issues

## 2024-02-19 NOTE — DIETITIAN INITIAL EVALUATION ADULT - PERTINENT MEDS FT
MEDICATIONS  (STANDING):  atorvastatin 10 milliGRAM(s) Oral at bedtime  cefTRIAXone   IVPB 1000 milliGRAM(s) IV Intermittent every 24 hours  enoxaparin Injectable 40 milliGRAM(s) SubCutaneous every 24 hours  lactated ringers. 1000 milliLiter(s) (80 mL/Hr) IV Continuous <Continuous>  mirtazapine 7.5 milliGRAM(s) Oral at bedtime  multivitamin 1 Tablet(s) Oral daily    MEDICATIONS  (PRN):  acetaminophen     Tablet .. 650 milliGRAM(s) Oral every 6 hours PRN Temp greater or equal to 38C (100.4F), Mild Pain (1 - 3)  aluminum hydroxide/magnesium hydroxide/simethicone Suspension 30 milliLiter(s) Oral every 4 hours PRN Dyspepsia  melatonin 3 milliGRAM(s) Oral at bedtime PRN Insomnia

## 2024-02-19 NOTE — DIETITIAN INITIAL EVALUATION ADULT - PROBLEM SELECTOR PLAN 4
Patient stated the dizziness is mostly when turning her head and getting out of bed.  She will come in Tomorrow to be evaluated for the dizziness.    C/w home mirtazipine 7.5mg qhs

## 2024-02-19 NOTE — DISCHARGE NOTE PROVIDER - HOSPITAL COURSE
#Discharge: do not delete     89 y/o F with PMHx of HTN, HLD, dementia, s/p R knee replacement presents to [facility] ED after sudden hallucinations and agitations, found with /73 and bacteruria, admitted for UTI complicated by worsening dementia and hypertensive urgency.     Problem List/Main Diagnoses:   #AMS (altered mental status).   ·  Plan: Likely 2/2 UTI given pyuria, suprapubic tenderness. Does not meet sepsis criteria. CTH negative for acute findings or NPH. No acutemetabolic abnormalities or hypoglycemia. No trauma, hypoxia, or ischemic EKG changes. Will treat with abx and obtain RP US and bladder scan to r/u retention and hydro. Received ctx for uti.    - f/u with pcp outpatient    #Acute UTI.   ·  Plan: See above.    #HTN (hypertension).   ·  Plan: Systolic up to 200 in ED which came down as patient became les agitated. Previously on telmisartan 80mg daily. |     #Dementia.   ·  Plan: C/w home mirtazipine 7.5mg qhs    New medications/therapies: none  New lines/hardware: none  Labs to be followed outpatient: none  Exam to be followed outpatient: none    Discharge plan: discharge to home    Physical Exam Upon Discharge:  General: in no acute distress, cachectic, ao 1-2  Eyes: EOMI intact bilaterally  HENT: Moist mucous membranes  Neck: Trachea midline, supple  Lungs: CTA B/L  Cardiovascular: RRR  Abdomen: Soft, non-tender non-distended  Extremities: WWP, No clubbing, cyanosis or edema  Neurological: Alert and oriented  Skin: Warm and dry   #Discharge: do not delete     89 y/o F with PMHx of HTN, HLD, dementia, s/p R knee replacement presents to [facility] ED after sudden hallucinations and agitations, found with /73 and bacteruria, admitted for UTI complicated by worsening dementia and hypertensive urgency.     Problem List/Main Diagnoses:   #AMS (altered mental status).   ·  Plan: Patient with metabolic encephalopathy. Likely 2/2 UTI given pyuria, suprapubic tenderness. Does not meet sepsis criteria. CTH negative for acute findings or NPH. No acutemetabolic abnormalities or hypoglycemia. No trauma, hypoxia, or ischemic EKG changes. Will treat with abx and obtain RP US and bladder scan to r/u retention and hydro. Received ctx for uti.    - f/u with pcp outpatient    #Acute UTI.   ·  Plan: See above.    #HTN (hypertension).   ·  Plan: Systolic up to 200 in ED which came down as patient became les agitated. Previously on telmisartan 80mg daily. |     #Dementia.   ·  Plan: C/w home mirtazipine 7.5mg qhs    New medications/therapies: none  New lines/hardware: none  Labs to be followed outpatient: none  Exam to be followed outpatient: none    Discharge plan: discharge to home    Physical Exam Upon Discharge:  General: in no acute distress, cachectic, ao 1-2  Eyes: EOMI intact bilaterally  HENT: Moist mucous membranes  Neck: Trachea midline, supple  Lungs: CTA B/L  Cardiovascular: RRR  Abdomen: Soft, non-tender non-distended  Extremities: WWP, No clubbing, cyanosis or edema  Neurological: Alert and oriented  Skin: Warm and dry

## 2024-02-19 NOTE — DIETITIAN INITIAL EVALUATION ADULT - ADD RECOMMEND
1. Monitor PO intake/appetite, GI distress (consider adding PRN Bowel care given noted use PTA), diet tolerance-s/s of issues chewing/swallowing, labs, weights.  2. Honor pt food preferences as able.  3. Should pt be noted with s/s of issues swallowing, recommend NPO/SLP.  4. RD to remain available for additional nutrition interventions as needed.  ** High Nutrition Risk.

## 2024-02-19 NOTE — DIETITIAN INITIAL EVALUATION ADULT - DIET TYPE
Recommend to liberalize diet in attempts to promote PO intake / I/s/o advanced age (liberalizing diet will allow for more menu options): Regular vs low sodium diet.

## 2024-02-19 NOTE — DISCHARGE NOTE PROVIDER - PROVIDER TOKENS
PROVIDER:[TOKEN:[[id number]],FOLLOWUP:[1 week]] PROVIDER:[TOKEN:[[id number]],FOLLOWUP:[1 week]],PROVIDER:[TOKEN:[[id number]],ESTABLISHEDPATIENT:[T]] PROVIDER:[TOKEN:[[id number]],SCHEDULEDAPPT:[02/27/2024],SCHEDULEDAPPTTIME:[01:30 PM],ESTABLISHEDPATIENT:[T]],PROVIDER:[TOKEN:[[id number]],ESTABLISHEDPATIENT:[T]] PROVIDER:[TOKEN:[[id number]],SCHEDULEDAPPT:[02/27/2024],SCHEDULEDAPPTTIME:[01:30 PM]],FREE:[LAST:[Fredy],FIRST:[Mathieu],PHONE:[phone]],FAX:[(   )    -],ADDRESS:[Urology  [address] [place] [address], [address]  [place] [address]],SCHEDULEDAPPT:[03/07/2024],SCHEDULEDAPPTTIME:[01:00 PM]]

## 2024-02-19 NOTE — DIETITIAN INITIAL EVALUATION ADULT - PROBLEM SELECTOR PLAN 5
F: LR 80cc/hr  E: Maintain K >4, Mg >2  N: pending bedside dysphagia   VTE ppx: Lovenox  Dispo: F  ---  CODE: FULL for now, pending confirmation with HCP

## 2024-02-19 NOTE — DISCHARGE NOTE PROVIDER - NSDCMRMEDTOKEN_GEN_ALL_CORE_FT
atorvastatin 10 mg oral tablet: 1 tab(s) orally once a day  mirtazapine 7.5 mg oral tablet: 1 tab(s) orally once a day  Multiple Vitamins oral tablet: 1 tab(s) orally once a day

## 2024-02-19 NOTE — DIETITIAN INITIAL EVALUATION ADULT - NSFNSGIIOFT_GEN_A_CORE
Per H&P use of senna mirtazapine and polyethylene glycol PTA. GI WDL per flow sheets at this time.

## 2024-02-19 NOTE — DISCHARGE NOTE PROVIDER - NSDCCPCAREPLAN_GEN_ALL_CORE_FT
PRINCIPAL DISCHARGE DIAGNOSIS  Diagnosis: Delirium  Assessment and Plan of Treatment: Urinary tract infections (UTI) is an infection in your urinary tract. It happens more commonly in women. They usually occur in the bladder or urethra, but more serious infections involve the kidney.  A bladder infection may cause pelvic pain, increased urge to urinate, pain with urination, and blood in the urine. A kidney infection may cause back pain, nausea, vomiting, and fever. Common treatment is with antibiotics. Your urine cultures grew bacteria so we gave you antibiotics.   Please follow up with your Primary Care Physician within 2 weeks or if your symptoms come back.

## 2024-02-19 NOTE — DIETITIAN INITIAL EVALUATION ADULT - OTHER INFO
89 y/o F with PMHx of HTN, HLD, dementia, s/p R knee replacement presents to [facility] ED after sudden hallucinations and agitations. Per HHA Jadyn, slept in usual state of health, awoke at 3AM agitated, speaking to hallucinations, and not responding to questions or engaging in conversions. BP at the time was 184/100. Of note, PCP removed a bp medication end of November / early December due to hypotensive episodes to 60/40s. Now is admitted for UTI complicated by worsening dementia and hypertensive urgency. CTH negative for acute findings or NPH. No acutemetabolic abnormalities or hypoglycemia. No trauma, hypoxia, or ischemic EKG changes. Will treat with abx and obtain RP US and bladder scan to r/u retention and hydro.    Pt seen this AM on 4UR. Pt was soundly sleeping. No family/HHA present this AM. Diet: DASH TLC. Breakfast ele was seen at bedside and had been untouched. Assume pt had not yet consumed breakfast i/s/o sleeping during time of meal drop off. Unable to assess tolerance to PO at this time as result. Per RN no report had been given in regards to any known issues chewing/swallowing + bedside dysphagia screen passed 2/18. Remeron ordered. MVI ordered. No pain. No Edema or pressure ulcer. /70, Lipitor ordered. Na K Mg Phos WDL, No lipid profile.   Please see below for nutritions recommendations. Recommendations made with team.

## 2024-02-19 NOTE — DIETITIAN INITIAL EVALUATION ADULT - PHYSCIAL ASSESSMENT
Pt consulted to be seen d/t MST score; per patient profile Unsure of of wt changes. Admit wt 119 pounds noted. Unable to preform NFPE however as pt soundly sleeping. Visual not feasible as pt was 100% covered with blankets during time of RD visit.

## 2024-02-19 NOTE — DISCHARGE NOTE PROVIDER - CARE PROVIDER_API CALL
Trip Escamilla  Internal Medicine  [address] [place] [address]  Phone: [phone]  Fax: [phone]  Follow Up Time: 1 week   Trip Escamilla  Internal Medicine  [address] [address]  Phone: [phone]  Fax: [phone]  Follow Up Time: 1 week    Mathieu Daniel  Urology  [address] [address]  Phone: [phone]  Fax: [phone]  Established Patient  Follow Up Time:    Trip Escamilla  Internal Medicine  [address] [address]  Phone: [phone]  Fax: [phone]  Established Patient  Scheduled Appointment: 02/27/2024 01:30 PM    Mathieu Daniel  Urology  [address] [address]  Phone: [phone]  Fax: [phone]  Established Patient  Follow Up Time:    Trip Escamilla  Internal Medicine  [address] [address]  Phone: [phone]  Fax: [phone]  Scheduled Appointment: 02/27/2024 01:30 PM    Mathieu Daniel  Urology  [address], [address]  [place] [address]  Phone: [phone]  Fax: (   )    -  Scheduled Appointment: 03/07/2024 01:00 PM

## 2024-02-19 NOTE — DISCHARGE NOTE PROVIDER - CARE PROVIDERS DIRECT ADDRESSES
,DirectAddress_Unknown ,DirectAddress_Unknown,[email or web address] ,DirectAddress_Unknown,DirectAddress_Unknown

## 2024-02-19 NOTE — DISCHARGE NOTE PROVIDER - NSDCFUSCHEDAPPT_GEN_ALL_CORE_FT
Mathieu Daniel  [facility] Physician [facility]  UROLOGY [address]  Scheduled Appointment: 03/07/2024

## 2024-02-19 NOTE — PROGRESS NOTE ADULT - SUBJECTIVE AND OBJECTIVE BOX
*****INCOMPLETE NOTE*****    INTERVAL HPI/OVERNIGHT EVENTS:    SUBJECTIVE: Patient seen and examined at bedside, resting comfortably in bed, and does not appear to be in any acute distress. Patient states  Patient denies any recent fevers, chills, nausea, vomiting, headache, acute sob, chest pain, abdominal pain, genitourinary sx, extremity pain or swelling.     ANTIBIOTICS/RELEVANT:    MEDICATIONS  (STANDING):  atorvastatin 10 milliGRAM(s) Oral at bedtime  cefTRIAXone   IVPB 1000 milliGRAM(s) IV Intermittent every 24 hours  enoxaparin Injectable 40 milliGRAM(s) SubCutaneous every 24 hours  lactated ringers. 1000 milliLiter(s) (80 mL/Hr) IV Continuous <Continuous>  mirtazapine 7.5 milliGRAM(s) Oral at bedtime  multivitamin 1 Tablet(s) Oral daily    MEDICATIONS  (PRN):  acetaminophen     Tablet .. 650 milliGRAM(s) Oral every 6 hours PRN Temp greater or equal to 38C (100.4F), Mild Pain (1 - 3)  aluminum hydroxide/magnesium hydroxide/simethicone Suspension 30 milliLiter(s) Oral every 4 hours PRN Dyspepsia  melatonin 3 milliGRAM(s) Oral at bedtime PRN Insomnia      Vital Signs Last 24 Hrs  T(C): 36.2 (2024 05:56), Max: 36.4 (2024 09:45)  T(F): 97.2 (2024 05:56), Max: 97.6 (2024 09:45)  HR: 92 (2024 05:56) (73 - 102)  BP: 143/88 (2024 05:56) (137/70 - 200/73)  BP(mean): --  RR: 18 (2024 05:56) (18 - 20)  SpO2: 97% (2024 05:56) (95% - 99%)    Parameters below as of 2024 09:45  Patient On (Oxygen Delivery Method): room air        PHYSICAL EXAM:  General: in no acute distress  Eyes: EOMI intact bilaterally. Anicteric sclerae, moist conjunctivae  HENT: Moist mucous membranes  Neck: Trachea midline, supple  Lungs: CTA B/L. No wheezes, rales, or rhonchi  Cardiovascular: RRR. No murmurs, rubs, or gallops  Abdomen: Soft, non-tender non-distended; No rebound or guarding  Extremities: WWP, No clubbing, cyanosis or edema  Neurological: Alert and oriented  Skin: Warm and dry. No obvious rash     LABS:                        15.4   8.93  )-----------( 285      ( 2024 09:54 )             46.1         139  |  102  |  15  ----------------------------<  108<H>  4.2   |  27  |  0.67    Ca    10.1      2024 09:54    TPro  8.2  /  Alb  4.3  /  TBili  0.9  /  DBili  x   /  AST  34  /  ALT  12  /  AlkPhos  87        Urinalysis Basic - ( 2024 09:54 )    Color: Yellow / Appearance: Cloudy / S.024 / pH: x  Gluc: 108 mg/dL / Ketone: Negative mg/dL  / Bili: Negative / Urobili: 2.0 mg/dL   Blood: x / Protein: 30 mg/dL / Nitrite: Negative   Leuk Esterase: Large / RBC: 5 /HPF /  /HPF   Sq Epi: x / Non Sq Epi: 0 /HPF / Bacteria: Too Numerous to count /HPF        MICROBIOLOGY:    RADIOLOGY & ADDITIONAL STUDIES: INTERVAL HPI/OVERNIGHT EVENTS: jim    SUBJECTIVE: Patient seen and examined at bedside, resting comfortably in bed, and does not appear to be in any acute distress. Patient denies any dysuria, hematuria or changes in frequency.    MEDICATIONS  (STANDING):  atorvastatin 10 milliGRAM(s) Oral at bedtime  cefTRIAXone   IVPB 1000 milliGRAM(s) IV Intermittent every 24 hours  enoxaparin Injectable 40 milliGRAM(s) SubCutaneous every 24 hours  lactated ringers. 1000 milliLiter(s) (80 mL/Hr) IV Continuous <Continuous>  mirtazapine 7.5 milliGRAM(s) Oral at bedtime  multivitamin 1 Tablet(s) Oral daily    MEDICATIONS  (PRN):  acetaminophen     Tablet .. 650 milliGRAM(s) Oral every 6 hours PRN Temp greater or equal to 38C (100.4F), Mild Pain (1 - 3)  aluminum hydroxide/magnesium hydroxide/simethicone Suspension 30 milliLiter(s) Oral every 4 hours PRN Dyspepsia  melatonin 3 milliGRAM(s) Oral at bedtime PRN Insomnia      Vital Signs Last 24 Hrs  T(C): 36.2 (2024 05:56), Max: 36.4 (2024 09:45)  T(F): 97.2 (2024 05:56), Max: 97.6 (2024 09:45)  HR: 92 (2024 05:56) (73 - 102)  BP: 143/88 (2024 05:56) (137/70 - 200/73)  BP(mean): --  RR: 18 (2024 05:56) (18 - 20)  SpO2: 97% (2024 05:56) (95% - 99%)    Parameters below as of 2024 09:45  Patient On (Oxygen Delivery Method): room air    PHYSICAL EXAM:  General: in no acute distress, cachectic, ao 1-2  Eyes: EOMI intact bilaterally  HENT: Moist mucous membranes  Neck: Trachea midline, supple  Lungs: CTA B/L  Cardiovascular: RRR  Abdomen: Soft, non-tender non-distended  Extremities: WWP, No clubbing, cyanosis or edema  Neurological: Alert and oriented  Skin: Warm and dry    LABS:                        15.4   8.93  )-----------( 285      ( 2024 09:54 )             46.1     -    139  |  102  |  15  ----------------------------<  108<H>  4.2   |  27  |  0.67    Ca    10.1      2024 09:54    TPro  8.2  /  Alb  4.3  /  TBili  0.9  /  DBili  x   /  AST  34  /  ALT  12  /  AlkPhos  87        Urinalysis Basic - ( 2024 09:54 )    Color: Yellow / Appearance: Cloudy / S.024 / pH: x  Gluc: 108 mg/dL / Ketone: Negative mg/dL  / Bili: Negative / Urobili: 2.0 mg/dL   Blood: x / Protein: 30 mg/dL / Nitrite: Negative   Leuk Esterase: Large / RBC: 5 /HPF /  /HPF   Sq Epi: x / Non Sq Epi: 0 /HPF / Bacteria: Too Numerous to count /HPF

## 2024-02-19 NOTE — DIETITIAN INITIAL EVALUATION ADULT - PERTINENT LABORATORY DATA
02-19    136  |  102  |  12  ----------------------------<  64<L>  4.3   |  22  |  0.64    Ca    9.0      19 Feb 2024 05:30  Phos  3.1     02-19  Mg     1.9     02-19    TPro  6.4  /  Alb  3.1<L>  /  TBili  0.8  /  DBili  x   /  AST  30  /  ALT  11  /  AlkPhos  73  02-19  A1C with Estimated Average Glucose Result: 5.4 % (03-11-23 @ 07:52)

## 2024-02-20 ENCOUNTER — TRANSCRIPTION ENCOUNTER (OUTPATIENT)
Age: 89
End: 2024-02-20

## 2024-02-20 VITALS
RESPIRATION RATE: 17 BRPM | TEMPERATURE: 97 F | OXYGEN SATURATION: 96 % | HEART RATE: 75 BPM | SYSTOLIC BLOOD PRESSURE: 141 MMHG | DIASTOLIC BLOOD PRESSURE: 75 MMHG

## 2024-02-20 LAB
ANION GAP SERPL CALC-SCNC: 18 MMOL/L — HIGH (ref 5–17)
BASOPHILS # BLD AUTO: 0.05 K/UL — SIGNIFICANT CHANGE UP (ref 0–0.2)
BASOPHILS NFR BLD AUTO: 0.7 % — SIGNIFICANT CHANGE UP (ref 0–2)
BUN SERPL-MCNC: 18 MG/DL — SIGNIFICANT CHANGE UP (ref 7–23)
CALCIUM SERPL-MCNC: 9.3 MG/DL — SIGNIFICANT CHANGE UP (ref 8.4–10.5)
CHLORIDE SERPL-SCNC: 104 MMOL/L — SIGNIFICANT CHANGE UP (ref 96–108)
CO2 SERPL-SCNC: 18 MMOL/L — LOW (ref 22–31)
CREAT SERPL-MCNC: 0.8 MG/DL — SIGNIFICANT CHANGE UP (ref 0.5–1.3)
EGFR: 67 ML/MIN/1.73M2 — SIGNIFICANT CHANGE UP
EOSINOPHIL # BLD AUTO: 0.09 K/UL — SIGNIFICANT CHANGE UP (ref 0–0.5)
EOSINOPHIL NFR BLD AUTO: 1.3 % — SIGNIFICANT CHANGE UP (ref 0–6)
GLUCOSE BLDC GLUCOMTR-MCNC: 73 MG/DL — SIGNIFICANT CHANGE UP (ref 70–99)
GLUCOSE SERPL-MCNC: 51 MG/DL — CRITICAL LOW (ref 70–99)
HCT VFR BLD CALC: 42.4 % — SIGNIFICANT CHANGE UP (ref 34.5–45)
HGB BLD-MCNC: 14.1 G/DL — SIGNIFICANT CHANGE UP (ref 11.5–15.5)
IMM GRANULOCYTES NFR BLD AUTO: 0.3 % — SIGNIFICANT CHANGE UP (ref 0–0.9)
LYMPHOCYTES # BLD AUTO: 1.58 K/UL — SIGNIFICANT CHANGE UP (ref 1–3.3)
LYMPHOCYTES # BLD AUTO: 23.5 % — SIGNIFICANT CHANGE UP (ref 13–44)
MAGNESIUM SERPL-MCNC: 2.4 MG/DL — SIGNIFICANT CHANGE UP (ref 1.6–2.6)
MCHC RBC-ENTMCNC: 33.3 GM/DL — SIGNIFICANT CHANGE UP (ref 32–36)
MCHC RBC-ENTMCNC: 34.1 PG — HIGH (ref 27–34)
MCV RBC AUTO: 102.4 FL — HIGH (ref 80–100)
MONOCYTES # BLD AUTO: 0.51 K/UL — SIGNIFICANT CHANGE UP (ref 0–0.9)
MONOCYTES NFR BLD AUTO: 7.6 % — SIGNIFICANT CHANGE UP (ref 2–14)
NEUTROPHILS # BLD AUTO: 4.47 K/UL — SIGNIFICANT CHANGE UP (ref 1.8–7.4)
NEUTROPHILS NFR BLD AUTO: 66.6 % — SIGNIFICANT CHANGE UP (ref 43–77)
NRBC # BLD: 0 /100 WBCS — SIGNIFICANT CHANGE UP (ref 0–0)
PHOSPHATE SERPL-MCNC: 3.4 MG/DL — SIGNIFICANT CHANGE UP (ref 2.5–4.5)
PLATELET # BLD AUTO: 256 K/UL — SIGNIFICANT CHANGE UP (ref 150–400)
POTASSIUM SERPL-MCNC: 4 MMOL/L — SIGNIFICANT CHANGE UP (ref 3.5–5.3)
POTASSIUM SERPL-SCNC: 4 MMOL/L — SIGNIFICANT CHANGE UP (ref 3.5–5.3)
RBC # BLD: 4.14 M/UL — SIGNIFICANT CHANGE UP (ref 3.8–5.2)
RBC # FLD: 12.6 % — SIGNIFICANT CHANGE UP (ref 10.3–14.5)
SODIUM SERPL-SCNC: 140 MMOL/L — SIGNIFICANT CHANGE UP (ref 135–145)
WBC # BLD: 6.72 K/UL — SIGNIFICANT CHANGE UP (ref 3.8–10.5)
WBC # FLD AUTO: 6.72 K/UL — SIGNIFICANT CHANGE UP (ref 3.8–10.5)

## 2024-02-20 PROCEDURE — 81001 URINALYSIS AUTO W/SCOPE: CPT

## 2024-02-20 PROCEDURE — 80053 COMPREHEN METABOLIC PANEL: CPT

## 2024-02-20 PROCEDURE — 83735 ASSAY OF MAGNESIUM: CPT

## 2024-02-20 PROCEDURE — 85027 COMPLETE CBC AUTOMATED: CPT

## 2024-02-20 PROCEDURE — 80048 BASIC METABOLIC PNL TOTAL CA: CPT

## 2024-02-20 PROCEDURE — 70450 CT HEAD/BRAIN W/O DYE: CPT | Mod: MA

## 2024-02-20 PROCEDURE — 85025 COMPLETE CBC W/AUTO DIFF WBC: CPT

## 2024-02-20 PROCEDURE — 84100 ASSAY OF PHOSPHORUS: CPT

## 2024-02-20 PROCEDURE — 87040 BLOOD CULTURE FOR BACTERIA: CPT

## 2024-02-20 PROCEDURE — 82962 GLUCOSE BLOOD TEST: CPT

## 2024-02-20 PROCEDURE — 87637 SARSCOV2&INF A&B&RSV AMP PRB: CPT

## 2024-02-20 PROCEDURE — 99285 EMERGENCY DEPT VISIT HI MDM: CPT | Mod: 25

## 2024-02-20 PROCEDURE — 36415 COLL VENOUS BLD VENIPUNCTURE: CPT

## 2024-02-20 PROCEDURE — 71045 X-RAY EXAM CHEST 1 VIEW: CPT

## 2024-02-20 RX ORDER — CEFTRIAXONE 500 MG/1
1000 INJECTION, POWDER, FOR SOLUTION INTRAMUSCULAR; INTRAVENOUS EVERY 24 HOURS
Refills: 0 | Status: COMPLETED | OUTPATIENT
Start: 2024-02-20 | End: 2024-02-20

## 2024-02-20 RX ADMIN — CEFTRIAXONE 100 MILLIGRAM(S): 500 INJECTION, POWDER, FOR SOLUTION INTRAMUSCULAR; INTRAVENOUS at 08:59

## 2024-02-20 RX ADMIN — POLYETHYLENE GLYCOL 3350 17 GRAM(S): 17 POWDER, FOR SOLUTION ORAL at 06:24

## 2024-02-20 RX ADMIN — ENOXAPARIN SODIUM 40 MILLIGRAM(S): 100 INJECTION SUBCUTANEOUS at 06:24

## 2024-02-20 NOTE — PROGRESS NOTE ADULT - ASSESSMENT
89 y/o F with PMHx of HTN, HLD, dementia, s/p R knee replacement presents to [facility] ED after sudden hallucinations and agitations, found with /73 and bacteruria, admitted for UTI complicated by worsening dementia and hypertensive urgency. 
Seems back to baseline  Plan discharge on po Rx
Cont IV fluids and antibiotic and observe today  Await cultures
 89 y/o F with PMHx of HTN, HLD, dementia, s/p R knee replacement presents to [facility] ED after sudden hallucinations and agitations, found with /73 and bacteruria, admitted for UTI complicated by worsening dementia and hypertensive urgency.

## 2024-02-20 NOTE — PROGRESS NOTE ADULT - PROBLEM SELECTOR PLAN 3
Systolic up to 200 in ED which came down as patient became les agitated. Previously on telmisartan 80mg daily. |     - If patient HTN would ensure she is not in pain or retaining, if other etiology of inpatient HTN r/o would give low dose losartan or lisinopril which has quicker onset of action.
Systolic up to 200 in ED which came down as patient became les agitated. Previously on telmisartan 80mg daily. |     - If patient HTN would ensure she is not in pain or retaining, if other etiology of inpatient HTN r/o would give low dose losartan or lisinopril which has quicker onset of action.

## 2024-02-20 NOTE — PROGRESS NOTE ADULT - PROBLEM SELECTOR PLAN 1
Likely 2/2 UTI given pyuria, suprapubic tenderness. Does not meet sepsis criteria. CTH negative for acute findings or NPH. No acutemetabolic abnormalities or hypoglycemia. No trauma, hypoxia, or ischemic EKG changes. Will treat with abx and obtain RP US and bladder scan to r/u retention and hydro.    - ceftriaxone 1g IV daily  - RP US  - bladder scan  - f/u blood and urine cx
Likely 2/2 UTI given pyuria, suprapubic tenderness. Does not meet sepsis criteria. CTH negative for acute findings or NPH. No acutemetabolic abnormalities or hypoglycemia. No trauma, hypoxia, or ischemic EKG changes. Will treat with abx and obtain RP US and bladder scan to r/u retention and hydro.    - ceftriaxone 1g IV daily  - RP US  - bladder scan  - f/u blood and urine cx

## 2024-02-20 NOTE — PROGRESS NOTE ADULT - SUBJECTIVE AND OBJECTIVE BOX
*****INCOMPLETE NOTE*****    INTERVAL HPI/OVERNIGHT EVENTS:    SUBJECTIVE: Patient seen and examined at bedside, resting comfortably in bed, and does not appear to be in any acute distress. Patient states  Patient denies any recent fevers, chills, nausea, vomiting, headache, acute sob, chest pain, abdominal pain, genitourinary sx, extremity pain or swelling.     ANTIBIOTICS/RELEVANT:    MEDICATIONS  (STANDING):  atorvastatin 10 milliGRAM(s) Oral at bedtime  cefTRIAXone   IVPB 1000 milliGRAM(s) IV Intermittent every 24 hours  enoxaparin Injectable 40 milliGRAM(s) SubCutaneous every 24 hours  lactated ringers. 1000 milliLiter(s) (80 mL/Hr) IV Continuous <Continuous>  mirtazapine 7.5 milliGRAM(s) Oral at bedtime  multivitamin 1 Tablet(s) Oral daily  polyethylene glycol 3350 17 Gram(s) Oral two times a day  senna 2 Tablet(s) Oral at bedtime    MEDICATIONS  (PRN):  acetaminophen     Tablet .. 650 milliGRAM(s) Oral every 6 hours PRN Temp greater or equal to 38C (100.4F), Mild Pain (1 - 3)  aluminum hydroxide/magnesium hydroxide/simethicone Suspension 30 milliLiter(s) Oral every 4 hours PRN Dyspepsia  melatonin 3 milliGRAM(s) Oral at bedtime PRN Insomnia      Vital Signs Last 24 Hrs  T(C): 36.4 (20 Feb 2024 06:17), Max: 36.6 (19 Feb 2024 09:01)  T(F): 97.6 (20 Feb 2024 06:17), Max: 97.9 (19 Feb 2024 09:01)  HR: 88 (20 Feb 2024 06:17) (75 - 90)  BP: 159/76 (20 Feb 2024 06:17) (122/70 - 159/76)  BP(mean): --  RR: 17 (20 Feb 2024 06:17) (17 - 18)  SpO2: 96% (20 Feb 2024 06:17) (96% - 97%)    Parameters below as of 20 Feb 2024 06:17  Patient On (Oxygen Delivery Method): room air        PHYSICAL EXAM:  General: in no acute distress  Eyes: EOMI intact bilaterally. Anicteric sclerae, moist conjunctivae  HENT: Moist mucous membranes  Neck: Trachea midline, supple  Lungs: CTA B/L. No wheezes, rales, or rhonchi  Cardiovascular: RRR. No murmurs, rubs, or gallops  Abdomen: Soft, non-tender non-distended; No rebound or guarding  Extremities: WWP, No clubbing, cyanosis or edema  Neurological: Alert and oriented  Skin: Warm and dry. No obvious rash     LABS:                        13.7   6.76  )-----------( 239      ( 19 Feb 2024 05:30 )             40.8     02-19    136  |  102  |  12  ----------------------------<  64<L>  4.3   |  22  |  0.64    Ca    9.0      19 Feb 2024 05:30  Phos  3.1     02-19  Mg     1.9     02-19    TPro  6.4  /  Alb  3.1<L>  /  TBili  0.8  /  DBili  x   /  AST  30  /  ALT  11  /  AlkPhos  73  02-19      Urinalysis Basic - ( 19 Feb 2024 05:30 )    Color: x / Appearance: x / SG: x / pH: x  Gluc: 64 mg/dL / Ketone: x  / Bili: x / Urobili: x   Blood: x / Protein: x / Nitrite: x   Leuk Esterase: x / RBC: x / WBC x   Sq Epi: x / Non Sq Epi: x / Bacteria: x        MICROBIOLOGY:    RADIOLOGY & ADDITIONAL STUDIES: INTERVAL HPI/OVERNIGHT EVENTS: jim    SUBJECTIVE: Patient seen and examined at bedside, resting comfortably in bed, and does not appear to be in any acute distress. Patient denies any dysuria or gu sx.    MEDICATIONS  (STANDING):  atorvastatin 10 milliGRAM(s) Oral at bedtime  cefTRIAXone   IVPB 1000 milliGRAM(s) IV Intermittent every 24 hours  enoxaparin Injectable 40 milliGRAM(s) SubCutaneous every 24 hours  lactated ringers. 1000 milliLiter(s) (80 mL/Hr) IV Continuous <Continuous>  mirtazapine 7.5 milliGRAM(s) Oral at bedtime  multivitamin 1 Tablet(s) Oral daily  polyethylene glycol 3350 17 Gram(s) Oral two times a day  senna 2 Tablet(s) Oral at bedtime    MEDICATIONS  (PRN):  acetaminophen     Tablet .. 650 milliGRAM(s) Oral every 6 hours PRN Temp greater or equal to 38C (100.4F), Mild Pain (1 - 3)  aluminum hydroxide/magnesium hydroxide/simethicone Suspension 30 milliLiter(s) Oral every 4 hours PRN Dyspepsia  melatonin 3 milliGRAM(s) Oral at bedtime PRN Insomnia    Vital Signs Last 24 Hrs  T(C): 36.4 (20 Feb 2024 06:17), Max: 36.6 (19 Feb 2024 09:01)  T(F): 97.6 (20 Feb 2024 06:17), Max: 97.9 (19 Feb 2024 09:01)  HR: 88 (20 Feb 2024 06:17) (75 - 90)  BP: 159/76 (20 Feb 2024 06:17) (122/70 - 159/76)  BP(mean): --  RR: 17 (20 Feb 2024 06:17) (17 - 18)  SpO2: 96% (20 Feb 2024 06:17) (96% - 97%)    Parameters below as of 20 Feb 2024 06:17  Patient On (Oxygen Delivery Method): room air        PHYSICAL EXAM:  General: in no acute distress, cachectic, ao 1-2  Eyes: EOMI intact bilaterally  HENT: Moist mucous membranes  Neck: Trachea midline, supple  Lungs: CTA B/L  Cardiovascular: RRR  Abdomen: Soft, non-tender non-distended  Extremities: WWP, No clubbing, cyanosis or edema  Neurological: Alert and oriented  Skin: Warm and dry    LABS:                        13.7   6.76  )-----------( 239      ( 19 Feb 2024 05:30 )             40.8     02-19    136  |  102  |  12  ----------------------------<  64<L>  4.3   |  22  |  0.64    Ca    9.0      19 Feb 2024 05:30  Phos  3.1     02-19  Mg     1.9     02-19    TPro  6.4  /  Alb  3.1<L>  /  TBili  0.8  /  DBili  x   /  AST  30  /  ALT  11  /  AlkPhos  73  02-19      Urinalysis Basic - ( 19 Feb 2024 05:30 )    Color: x / Appearance: x / SG: x / pH: x  Gluc: 64 mg/dL / Ketone: x  / Bili: x / Urobili: x   Blood: x / Protein: x / Nitrite: x   Leuk Esterase: x / RBC: x / WBC x   Sq Epi: x / Non Sq Epi: x / Bacteria: x

## 2024-02-20 NOTE — PROGRESS NOTE ADULT - PROBLEM SELECTOR PLAN 5
F: LR 80cc/hr  E: Maintain K >4, Mg >2  N: pending bedside dysphagia   VTE ppx: Lovenox  Dispo: F  ---  CODE: FULL for now, pending confirmation with HCP
F: LR 80cc/hr  E: Maintain K >4, Mg >2  N: pending bedside dysphagia   VTE ppx: Lovenox  Dispo: F  ---  CODE: FULL for now, pending confirmation with HCP

## 2024-02-20 NOTE — DISCHARGE NOTE NURSING/CASE MANAGEMENT/SOCIAL WORK - PATIENT PORTAL LINK FT
You can access the FollowMyHealth Patient Portal offered by [facility] by registering at the following website: [email or web address]. By joining [employer]’s FollowMyHealth portal, you will also be able to view your health information using other applications (apps) compatible with our system.

## 2024-02-23 LAB
CULTURE RESULTS: SIGNIFICANT CHANGE UP
CULTURE RESULTS: SIGNIFICANT CHANGE UP
SPECIMEN SOURCE: SIGNIFICANT CHANGE UP
SPECIMEN SOURCE: SIGNIFICANT CHANGE UP

## 2024-02-26 NOTE — PATIENT PROFILE ADULT - FALL HARM RISK - DEVICES
Name: Stacy Gage  YOB: 1963  Gender: female  MRN: [id number]    Summary:   Right posttraumatic end-stage ankle arthritis       CC: Follow-up (Right end-stage ankle arthritis.)       HPI: Stacy Gage is a 60 y.o. female who presents with Follow-up (Right end-stage ankle arthritis.)  .  This patient presents back the office today with continued complaints of right posttraumatic ankle arthritis pain.  She also complains of bilateral foot pain secondary to her gait changes from the arthritic ankle.    History was obtained by Patient     ROS/Meds/PSH/PMH/FH/SH: I personally reviewed the patients standard intake form.  Below are the pertinents    Tobacco:  reports that she has never smoked. She has never used smokeless tobacco.  Diabetes: None      Physical Examination:  Exam of the right lower extremity shows increasingly limited tibiotalar joint range of motion with palpable pulses and intact sensation.      Imaging:   No imaging reviewed           LATISHA VOSS III, MD           Assessment:   Right posttraumatic ankle arthritis, end-stage    Treatment Plan:   4 This is a chronic illness/condition with exacerbation and progression  Treatment at this time: Minor Procedure: Injection done today: The patient understands the risks and complications associated with injection. After sterile prep of the area, the Right ankle joint was injected with 3 cc. of Xylocaine and 3 cc. of steroid.. 40mg Depo Medrol was the steroid used.  Patient tolerated it well.  I discussed the risk of infection and skin blanching.  I told the be patient be careful about the symptoms of hyperglycemia such as GI distress, polyuria, excessive thirst and lethargy.  If these symptoms occur they should present to an primary care doctor or urgent facility  Studies ordered: NO XR needed @ Next Visit    Weight-bearing status: WBAT        Return to work/work restrictions: none  No medications given    A decision was 
Cane/Walker

## 2024-02-28 DIAGNOSIS — R44.3 HALLUCINATIONS, UNSPECIFIED: ICD-10-CM

## 2024-02-28 DIAGNOSIS — N39.0 URINARY TRACT INFECTION, SITE NOT SPECIFIED: ICD-10-CM

## 2024-02-28 DIAGNOSIS — I16.0 HYPERTENSIVE URGENCY: ICD-10-CM

## 2024-02-28 DIAGNOSIS — I10 ESSENTIAL (PRIMARY) HYPERTENSION: ICD-10-CM

## 2024-02-28 DIAGNOSIS — E78.5 HYPERLIPIDEMIA, UNSPECIFIED: ICD-10-CM

## 2024-02-28 DIAGNOSIS — F03.911 UNSPECIFIED DEMENTIA, UNSPECIFIED SEVERITY, WITH AGITATION: ICD-10-CM

## 2024-03-07 ENCOUNTER — APPOINTMENT (OUTPATIENT)
Dept: UROLOGY | Facility: CLINIC | Age: 89
End: 2024-03-07

## 2024-03-11 ENCOUNTER — NON-APPOINTMENT (OUTPATIENT)
Age: 89
End: 2024-03-11

## 2024-03-14 ENCOUNTER — APPOINTMENT (OUTPATIENT)
Dept: UROLOGY | Facility: CLINIC | Age: 89
End: 2024-03-14
Payer: MEDICARE

## 2024-03-14 DIAGNOSIS — R33.9 RETENTION OF URINE, UNSPECIFIED: ICD-10-CM

## 2024-03-14 PROCEDURE — 99213 OFFICE O/P EST LOW 20 MIN: CPT

## 2024-03-14 PROCEDURE — 51798 US URINE CAPACITY MEASURE: CPT

## 2024-03-14 PROCEDURE — 81003 URINALYSIS AUTO W/O SCOPE: CPT | Mod: QW

## 2024-03-14 NOTE — ASSESSMENT
[FreeTextEntry1] : Findings today are consistent with resolution of the patient's previous urinary retention.  I recommended that she remain with the [place] out.  I also recommended that she produce a specimen if and when she develops any urinary symptoms, and she was given a sterile container to facilitate this.  In the meanwhile I will see her on an as-needed basis going forward. Cheyanne Aldridge MD

## 2024-03-14 NOTE — PHYSICAL EXAM
[Well Groomed] : well groomed [General Appearance - In No Acute Distress] : no acute distress [Edema] : no peripheral edema [] : no respiratory distress [Abdomen Soft] : soft [Abdomen Tenderness] : non-tender [No Focal Deficits] : no focal deficits [Affect] : the affect was normal [Mood] : the mood was normal [Not Anxious] : not anxious [de-identified] : Ambulates with w walker and an aide.

## 2024-03-14 NOTE — HISTORY OF PRESENT ILLNESS
[FreeTextEntry1] : 89 YO F seen  4/28/2023 as NPT for a catheter consultation, accompanied by her home health aide. She was hospitalized at [facility] mid March after suffering a pelvic fracture from a fall at home. She was discharged to a subacute rehab for 2 weeks, and returned back home in April. A Dawson catheter was inserted during the hospitalization, re-inserted at the rehab center due to urinary retention. The Dawson catheter has been inserted for one month. She has home physical therapy at home. NKMA lipitor, aspirin 81 mg, multivitamin, mirtazapine, losartan Her Dawson catheter is draining clear yellow urine. Since she is being seen today, on Friday afternoon, we will maintain her Dawson catheter over the weekend. She will return next Tuesday for a voiding trial.   Patient seen  5/2/2023 for TOV. Dawson removed after instilling 180 ml H2O, patient voided > 110 ml, PVR 68 ml. Successful VT TOV today. Patient and aide advised to continue H2O intake, monitor urine output, contact the office or come in for any problems, otherwise FU 2 - 3 weeks for PVR and C+S.   Patient seen 6/6/2023 for PVR, C+S. She is accompanied by her home health aide. She denies dysuria. Her aide states that she does not drink water very frequently UA: nitrites + PVR 2 cc Patient remains stable off therapy with an empty bladder after urination. We discussed the benefits of remaining well hydrated and I recommended this to the patient and her aide. Urine sent for C+S, if negative, ten FU 6 months. We would only treat a symptomatic UTI. C+S >100K E. Coli, > 100K Aerococcus, asymptomatic, no treatment indicated.  Patient cancelled appointment for 1/11/2024 with me. Patient [facility] 3/7/2024 with Dr. Daniel  3/11/2024. Patient's RN called office, patient's Dawson catheter fell out 3/10/2024.  Patient seen TODAY 3/14/2024 to reassess. She has been urinating at home without any issues and with no apparent discomfort or incontinence. UA patient unable to leave a urine specimen today. PVR 14 ml.

## 2024-04-04 NOTE — HISTORY OF PRESENT ILLNESS
[FreeTextEntry1] : Patient of Dr. Aldridge  89 year old female History of prior urinary retention, Dawson placements, TOV in past.   Admitted 2/18/24 to [facility]  PMHx of HTN, HLD, dementia, s/p R knee replacement presented to [facility] ED after sudden hallucinations and agitations, found with /73 and bacteruria, admitted for UTI complicated by worsening dementia and HTN. Thought to have metabolic encephalopathy likely 2/2 UTI.  Discharged with Dawson (placed with 450 in bladder) UCX grew e.coli.

## 2024-07-16 ENCOUNTER — EMERGENCY (EMERGENCY)
Facility: HOSPITAL | Age: 89
LOS: 1 days | Discharge: HOME | End: 2024-07-16
Attending: STUDENT IN AN ORGANIZED HEALTH CARE EDUCATION/TRAINING PROGRAM | Admitting: STUDENT IN AN ORGANIZED HEALTH CARE EDUCATION/TRAINING PROGRAM
Payer: MEDICARE

## 2024-07-16 VITALS
OXYGEN SATURATION: 98 % | TEMPERATURE: 98 F | SYSTOLIC BLOOD PRESSURE: 127 MMHG | RESPIRATION RATE: 18 BRPM | HEIGHT: 57 IN | HEART RATE: 70 BPM | DIASTOLIC BLOOD PRESSURE: 70 MMHG | WEIGHT: 89.95 LBS

## 2024-07-16 DIAGNOSIS — Z96.651 PRESENCE OF RIGHT ARTIFICIAL KNEE JOINT: Chronic | ICD-10-CM

## 2024-07-16 PROCEDURE — 99283 EMERGENCY DEPT VISIT LOW MDM: CPT

## 2024-07-16 RX ORDER — MUPIROCIN 20 MG/G
1 OINTMENT TOPICAL
Qty: 15 | Refills: 0
Start: 2024-07-16 | End: 2024-07-25

## 2024-07-16 RX ORDER — CEPHALEXIN 500 MG
1 CAPSULE ORAL
Qty: 40 | Refills: 0
Start: 2024-07-16 | End: 2024-07-25
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
## 2024-07-19 DIAGNOSIS — S01.03XA PUNCTURE WOUND WITHOUT FOREIGN BODY OF SCALP, INITIAL ENCOUNTER: ICD-10-CM

## 2024-07-19 DIAGNOSIS — L08.9 LOCAL INFECTION OF THE SKIN AND SUBCUTANEOUS TISSUE, UNSPECIFIED: ICD-10-CM
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
## 2024-07-19 DIAGNOSIS — S00.01XA ABRASION OF SCALP, INITIAL ENCOUNTER: ICD-10-CM

## 2025-06-10 NOTE — ED PROVIDER NOTE - PHYSICAL EXAMINATION
nasal cannula
General: Awake, alert and oriented. No acute distress. Well developed, hydrated and nourished. Appears stated age.  Skin: Skin in warm, dry and intact without rashes or lesions. Appropriate color for ethnicity  HENMT: head normocephalic and atraumatic; bilateral external ears without swelling. no nasal discharge. moist oral mucosa. supple neck, trachea midline  EYES: Conjunctiva clear. nonicteric sclera. EOM intact, Eyelids are normal in appearance without swelling or lesions, no nystagmus, no visual field deficits  Cardiac: well perfused, s1, s2, rrr  Respiratory: breathing comfortably on room air. no audible wheezing or stridor  Abdominal: nondistended, soft, nontender  MSK: Neck and back are without deformity, visible external skin changes, or signs of trauma. Curvature of the cervical, thoracic, and lumbar spine are within normal limits. no external signs of trauma. no apparent deficits in ROM of any extremity  Neurological: The patient is awake, alert and oriented to person, place, and time with normal speech. CN 2-12 intact. following commands, 5/5 strength and intact and equal sensation in all 4 extremities. no apparent deficits. Memory is normal and thought process is intact.  Psychiatric: Appropriate mood and affect. Good judgement and insight. No visual or auditory hallucinations.